# Patient Record
Sex: FEMALE | Race: BLACK OR AFRICAN AMERICAN | NOT HISPANIC OR LATINO | Employment: UNEMPLOYED | URBAN - METROPOLITAN AREA
[De-identification: names, ages, dates, MRNs, and addresses within clinical notes are randomized per-mention and may not be internally consistent; named-entity substitution may affect disease eponyms.]

---

## 2017-02-15 ENCOUNTER — ALLSCRIPTS OFFICE VISIT (OUTPATIENT)
Dept: OTHER | Facility: OTHER | Age: 32
End: 2017-02-15

## 2017-02-27 ENCOUNTER — GENERIC CONVERSION - ENCOUNTER (OUTPATIENT)
Dept: OTHER | Facility: OTHER | Age: 32
End: 2017-02-27

## 2017-03-15 ENCOUNTER — GENERIC CONVERSION - ENCOUNTER (OUTPATIENT)
Dept: OTHER | Facility: OTHER | Age: 32
End: 2017-03-15

## 2017-03-21 ENCOUNTER — APPOINTMENT (OUTPATIENT)
Dept: LAB | Facility: CLINIC | Age: 32
End: 2017-03-21
Payer: COMMERCIAL

## 2017-03-21 ENCOUNTER — ALLSCRIPTS OFFICE VISIT (OUTPATIENT)
Dept: OTHER | Facility: OTHER | Age: 32
End: 2017-03-21

## 2017-03-21 DIAGNOSIS — Z34.90 ENCOUNTER FOR SUPERVISION OF NORMAL PREGNANCY: ICD-10-CM

## 2017-03-21 LAB
BACTERIA UR QL AUTO: ABNORMAL /HPF
BASOPHILS # BLD AUTO: 0.02 THOUSANDS/ΜL (ref 0–0.1)
BASOPHILS NFR BLD AUTO: 0 % (ref 0–1)
BILIRUB UR QL STRIP: NEGATIVE
CLARITY UR: ABNORMAL
COLOR UR: YELLOW
EOSINOPHIL # BLD AUTO: 0.1 THOUSAND/ΜL (ref 0–0.61)
EOSINOPHIL NFR BLD AUTO: 1 % (ref 0–6)
ERYTHROCYTE [DISTWIDTH] IN BLOOD BY AUTOMATED COUNT: 15.7 % (ref 11.6–15.1)
GLUCOSE UR STRIP-MCNC: NEGATIVE MG/DL
HCT VFR BLD AUTO: 36.6 % (ref 34.8–46.1)
HGB BLD-MCNC: 12 G/DL (ref 11.5–15.4)
HGB UR QL STRIP.AUTO: NEGATIVE
KETONES UR STRIP-MCNC: NEGATIVE MG/DL
LEUKOCYTE ESTERASE UR QL STRIP: ABNORMAL
LYMPHOCYTES # BLD AUTO: 2.81 THOUSANDS/ΜL (ref 0.6–4.47)
LYMPHOCYTES NFR BLD AUTO: 19 % (ref 14–44)
MCH RBC QN AUTO: 27 PG (ref 26.8–34.3)
MCHC RBC AUTO-ENTMCNC: 32.8 G/DL (ref 31.4–37.4)
MCV RBC AUTO: 82 FL (ref 82–98)
MONOCYTES # BLD AUTO: 0.93 THOUSAND/ΜL (ref 0.17–1.22)
MONOCYTES NFR BLD AUTO: 6 % (ref 4–12)
NEUTROPHILS # BLD AUTO: 10.71 THOUSANDS/ΜL (ref 1.85–7.62)
NEUTS SEG NFR BLD AUTO: 74 % (ref 43–75)
NITRITE UR QL STRIP: NEGATIVE
NON-SQ EPI CELLS URNS QL MICRO: ABNORMAL /HPF
NRBC BLD AUTO-RTO: 0 /100 WBCS
PH UR STRIP.AUTO: 6.5 [PH] (ref 4.5–8)
PLATELET # BLD AUTO: 297 THOUSANDS/UL (ref 149–390)
PMV BLD AUTO: 10 FL (ref 8.9–12.7)
PROT UR STRIP-MCNC: ABNORMAL MG/DL
RBC # BLD AUTO: 4.44 MILLION/UL (ref 3.81–5.12)
RBC #/AREA URNS AUTO: ABNORMAL /HPF
RUBV IGG SERPL IA-ACNC: 59.8 IU/ML
SP GR UR STRIP.AUTO: 1.03 (ref 1–1.03)
UROBILINOGEN UR QL STRIP.AUTO: 0.2 E.U./DL
WBC # BLD AUTO: 14.61 THOUSAND/UL (ref 4.31–10.16)
WBC #/AREA URNS AUTO: ABNORMAL /HPF

## 2017-03-21 PROCEDURE — 87086 URINE CULTURE/COLONY COUNT: CPT

## 2017-03-21 PROCEDURE — 80081 OBSTETRIC PANEL INC HIV TSTG: CPT

## 2017-03-21 PROCEDURE — 81001 URINALYSIS AUTO W/SCOPE: CPT

## 2017-03-21 PROCEDURE — 36415 COLL VENOUS BLD VENIPUNCTURE: CPT

## 2017-03-22 LAB
BACTERIA UR CULT: NORMAL
HBV SURFACE AG SER QL: NORMAL
HIV 1+2 AB+HIV1 P24 AG SERPL QL IA: NORMAL
RPR SER QL: NORMAL

## 2017-03-23 ENCOUNTER — GENERIC CONVERSION - ENCOUNTER (OUTPATIENT)
Dept: OTHER | Facility: OTHER | Age: 32
End: 2017-03-23

## 2017-03-23 ENCOUNTER — LAB REQUISITION (OUTPATIENT)
Dept: LAB | Facility: HOSPITAL | Age: 32
End: 2017-03-23
Payer: COMMERCIAL

## 2017-03-23 DIAGNOSIS — Z34.90 ENCOUNTER FOR SUPERVISION OF NORMAL PREGNANCY: ICD-10-CM

## 2017-03-23 PROCEDURE — 88175 CYTOPATH C/V AUTO FLUID REDO: CPT | Performed by: OBSTETRICS & GYNECOLOGY

## 2017-03-23 PROCEDURE — 87491 CHLMYD TRACH DNA AMP PROBE: CPT | Performed by: OBSTETRICS & GYNECOLOGY

## 2017-03-23 PROCEDURE — 87624 HPV HI-RISK TYP POOLED RSLT: CPT | Performed by: OBSTETRICS & GYNECOLOGY

## 2017-03-23 PROCEDURE — 87591 N.GONORRHOEAE DNA AMP PROB: CPT | Performed by: OBSTETRICS & GYNECOLOGY

## 2017-03-24 ENCOUNTER — GENERIC CONVERSION - ENCOUNTER (OUTPATIENT)
Dept: OTHER | Facility: OTHER | Age: 32
End: 2017-03-24

## 2017-03-31 ENCOUNTER — ALLSCRIPTS OFFICE VISIT (OUTPATIENT)
Dept: PERINATAL CARE | Facility: MEDICAL CENTER | Age: 32
End: 2017-03-31
Payer: COMMERCIAL

## 2017-03-31 ENCOUNTER — GENERIC CONVERSION - ENCOUNTER (OUTPATIENT)
Dept: OTHER | Facility: OTHER | Age: 32
End: 2017-03-31

## 2017-03-31 LAB
CHLAMYDIA DNA CVX QL NAA+PROBE: NORMAL
HPV RRNA GENITAL QL NAA+PROBE: NORMAL
N GONORRHOEA DNA GENITAL QL NAA+PROBE: NORMAL

## 2017-03-31 PROCEDURE — 76817 TRANSVAGINAL US OBSTETRIC: CPT | Performed by: OBSTETRICS & GYNECOLOGY

## 2017-03-31 PROCEDURE — 76805 OB US >/= 14 WKS SNGL FETUS: CPT | Performed by: OBSTETRICS & GYNECOLOGY

## 2017-04-03 LAB
LAB AP GYN PRIMARY INTERPRETATION: NORMAL
LAB AP LMP: NORMAL
Lab: NORMAL

## 2017-04-14 ENCOUNTER — GENERIC CONVERSION - ENCOUNTER (OUTPATIENT)
Dept: OTHER | Facility: OTHER | Age: 32
End: 2017-04-14

## 2017-04-14 ENCOUNTER — ALLSCRIPTS OFFICE VISIT (OUTPATIENT)
Dept: PERINATAL CARE | Facility: MEDICAL CENTER | Age: 32
End: 2017-04-14
Payer: COMMERCIAL

## 2017-04-14 PROCEDURE — 76817 TRANSVAGINAL US OBSTETRIC: CPT | Performed by: OBSTETRICS & GYNECOLOGY

## 2017-04-18 ENCOUNTER — GENERIC CONVERSION - ENCOUNTER (OUTPATIENT)
Dept: OTHER | Facility: OTHER | Age: 32
End: 2017-04-18

## 2017-04-21 ENCOUNTER — APPOINTMENT (OUTPATIENT)
Dept: PERINATAL CARE | Facility: MEDICAL CENTER | Age: 32
End: 2017-04-21
Payer: COMMERCIAL

## 2017-04-21 ENCOUNTER — GENERIC CONVERSION - ENCOUNTER (OUTPATIENT)
Dept: OTHER | Facility: OTHER | Age: 32
End: 2017-04-21

## 2017-04-21 PROCEDURE — 96372 THER/PROPH/DIAG INJ SC/IM: CPT | Performed by: OBSTETRICS & GYNECOLOGY

## 2017-04-28 ENCOUNTER — GENERIC CONVERSION - ENCOUNTER (OUTPATIENT)
Dept: OTHER | Facility: OTHER | Age: 32
End: 2017-04-28

## 2017-04-28 ENCOUNTER — APPOINTMENT (OUTPATIENT)
Dept: PERINATAL CARE | Facility: MEDICAL CENTER | Age: 32
End: 2017-04-28
Payer: COMMERCIAL

## 2017-04-28 PROCEDURE — 96372 THER/PROPH/DIAG INJ SC/IM: CPT | Performed by: OBSTETRICS & GYNECOLOGY

## 2017-05-02 ENCOUNTER — ALLSCRIPTS OFFICE VISIT (OUTPATIENT)
Dept: PERINATAL CARE | Facility: MEDICAL CENTER | Age: 32
End: 2017-05-02
Payer: COMMERCIAL

## 2017-05-02 ENCOUNTER — GENERIC CONVERSION - ENCOUNTER (OUTPATIENT)
Dept: OTHER | Facility: OTHER | Age: 32
End: 2017-05-02

## 2017-05-02 PROCEDURE — 76811 OB US DETAILED SNGL FETUS: CPT | Performed by: OBSTETRICS & GYNECOLOGY

## 2017-05-02 PROCEDURE — 76817 TRANSVAGINAL US OBSTETRIC: CPT | Performed by: OBSTETRICS & GYNECOLOGY

## 2017-05-12 ENCOUNTER — ALLSCRIPTS OFFICE VISIT (OUTPATIENT)
Dept: PERINATAL CARE | Facility: MEDICAL CENTER | Age: 32
End: 2017-05-12
Payer: COMMERCIAL

## 2017-05-12 ENCOUNTER — GENERIC CONVERSION - ENCOUNTER (OUTPATIENT)
Dept: OTHER | Facility: OTHER | Age: 32
End: 2017-05-12

## 2017-05-12 PROCEDURE — 96372 THER/PROPH/DIAG INJ SC/IM: CPT | Performed by: OBSTETRICS & GYNECOLOGY

## 2017-05-12 PROCEDURE — 76817 TRANSVAGINAL US OBSTETRIC: CPT | Performed by: OBSTETRICS & GYNECOLOGY

## 2017-05-19 ENCOUNTER — GENERIC CONVERSION - ENCOUNTER (OUTPATIENT)
Dept: PERINATAL CARE | Facility: MEDICAL CENTER | Age: 32
End: 2017-05-19

## 2017-05-19 ENCOUNTER — ALLSCRIPTS OFFICE VISIT (OUTPATIENT)
Dept: PERINATAL CARE | Facility: MEDICAL CENTER | Age: 32
End: 2017-05-19
Payer: COMMERCIAL

## 2017-05-19 ENCOUNTER — GENERIC CONVERSION - ENCOUNTER (OUTPATIENT)
Dept: OTHER | Facility: OTHER | Age: 32
End: 2017-05-19

## 2017-05-19 PROCEDURE — 76815 OB US LIMITED FETUS(S): CPT | Performed by: OBSTETRICS & GYNECOLOGY

## 2017-05-19 PROCEDURE — 96372 THER/PROPH/DIAG INJ SC/IM: CPT | Performed by: OBSTETRICS & GYNECOLOGY

## 2017-05-19 PROCEDURE — 76817 TRANSVAGINAL US OBSTETRIC: CPT | Performed by: OBSTETRICS & GYNECOLOGY

## 2017-05-25 ENCOUNTER — GENERIC CONVERSION - ENCOUNTER (OUTPATIENT)
Dept: OTHER | Facility: OTHER | Age: 32
End: 2017-05-25

## 2017-05-26 ENCOUNTER — GENERIC CONVERSION - ENCOUNTER (OUTPATIENT)
Dept: OTHER | Facility: OTHER | Age: 32
End: 2017-05-26

## 2017-05-26 ENCOUNTER — APPOINTMENT (OUTPATIENT)
Dept: PERINATAL CARE | Facility: MEDICAL CENTER | Age: 32
End: 2017-05-26
Payer: COMMERCIAL

## 2017-05-26 PROCEDURE — 96372 THER/PROPH/DIAG INJ SC/IM: CPT | Performed by: OBSTETRICS & GYNECOLOGY

## 2017-05-30 ENCOUNTER — ALLSCRIPTS OFFICE VISIT (OUTPATIENT)
Dept: PERINATAL CARE | Facility: MEDICAL CENTER | Age: 32
End: 2017-05-30
Payer: COMMERCIAL

## 2017-05-30 ENCOUNTER — GENERIC CONVERSION - ENCOUNTER (OUTPATIENT)
Dept: OTHER | Facility: OTHER | Age: 32
End: 2017-05-30

## 2017-05-30 PROCEDURE — 76817 TRANSVAGINAL US OBSTETRIC: CPT | Performed by: OBSTETRICS & GYNECOLOGY

## 2017-05-30 PROCEDURE — 76816 OB US FOLLOW-UP PER FETUS: CPT | Performed by: OBSTETRICS & GYNECOLOGY

## 2017-06-02 ENCOUNTER — ALLSCRIPTS OFFICE VISIT (OUTPATIENT)
Dept: PERINATAL CARE | Facility: MEDICAL CENTER | Age: 32
End: 2017-06-02
Payer: COMMERCIAL

## 2017-06-02 PROCEDURE — 96372 THER/PROPH/DIAG INJ SC/IM: CPT | Performed by: OBSTETRICS & GYNECOLOGY

## 2017-06-05 ENCOUNTER — GENERIC CONVERSION - ENCOUNTER (OUTPATIENT)
Dept: OTHER | Facility: OTHER | Age: 32
End: 2017-06-05

## 2017-06-09 ENCOUNTER — GENERIC CONVERSION - ENCOUNTER (OUTPATIENT)
Dept: OTHER | Facility: OTHER | Age: 32
End: 2017-06-09

## 2017-06-09 ENCOUNTER — APPOINTMENT (OUTPATIENT)
Dept: PERINATAL CARE | Facility: MEDICAL CENTER | Age: 32
End: 2017-06-09
Payer: COMMERCIAL

## 2017-06-09 PROCEDURE — 96372 THER/PROPH/DIAG INJ SC/IM: CPT | Performed by: OBSTETRICS & GYNECOLOGY

## 2017-06-14 DIAGNOSIS — Z34.90 ENCOUNTER FOR SUPERVISION OF NORMAL PREGNANCY: ICD-10-CM

## 2017-06-16 ENCOUNTER — ALLSCRIPTS OFFICE VISIT (OUTPATIENT)
Dept: PERINATAL CARE | Facility: MEDICAL CENTER | Age: 32
End: 2017-06-16
Payer: COMMERCIAL

## 2017-06-16 PROCEDURE — 96372 THER/PROPH/DIAG INJ SC/IM: CPT | Performed by: OBSTETRICS & GYNECOLOGY

## 2017-06-20 ENCOUNTER — HOSPITAL ENCOUNTER (OUTPATIENT)
Facility: HOSPITAL | Age: 32
Discharge: HOME | End: 2017-06-20
Attending: OBSTETRICS & GYNECOLOGY | Admitting: OBSTETRICS & GYNECOLOGY
Payer: COMMERCIAL

## 2017-06-20 VITALS
HEIGHT: 65 IN | TEMPERATURE: 99 F | HEART RATE: 108 BPM | WEIGHT: 202 LBS | SYSTOLIC BLOOD PRESSURE: 122 MMHG | BODY MASS INDEX: 33.66 KG/M2 | DIASTOLIC BLOOD PRESSURE: 64 MMHG | RESPIRATION RATE: 16 BRPM

## 2017-06-20 DIAGNOSIS — O47.00 PREMATURE UTERINE CONTRACTIONS: ICD-10-CM

## 2017-06-20 PROCEDURE — 99213 OFFICE O/P EST LOW 20 MIN: CPT

## 2017-06-20 PROCEDURE — 76817 TRANSVAGINAL US OBSTETRIC: CPT | Performed by: OBSTETRICS & GYNECOLOGY

## 2017-06-20 RX ORDER — BETAMETHASONE SODIUM PHOSPHATE AND BETAMETHASONE ACETATE 3; 3 MG/ML; MG/ML
12 INJECTION, SUSPENSION INTRA-ARTICULAR; INTRALESIONAL; INTRAMUSCULAR; SOFT TISSUE EVERY 24 HOURS
Status: DISCONTINUED | OUTPATIENT
Start: 2017-06-20 | End: 2017-06-20 | Stop reason: HOSPADM

## 2017-06-20 RX ADMIN — BETAMETHASONE SODIUM PHOSPHATE AND BETAMETHASONE ACETATE 12 MG: 3; 3 INJECTION, SUSPENSION INTRA-ARTICULAR; INTRALESIONAL; INTRAMUSCULAR at 13:50

## 2017-06-21 ENCOUNTER — HOSPITAL ENCOUNTER (OUTPATIENT)
Facility: HOSPITAL | Age: 32
Discharge: HOME | End: 2017-06-21
Attending: OBSTETRICS & GYNECOLOGY | Admitting: OBSTETRICS & GYNECOLOGY
Payer: COMMERCIAL

## 2017-06-21 ENCOUNTER — HOSPITAL ENCOUNTER (OUTPATIENT)
Facility: HOSPITAL | Age: 32
End: 2017-06-21
Attending: OBSTETRICS & GYNECOLOGY | Admitting: OBSTETRICS & GYNECOLOGY
Payer: COMMERCIAL

## 2017-06-21 PROCEDURE — 96372 THER/PROPH/DIAG INJ SC/IM: CPT

## 2017-06-21 RX ORDER — BETAMETHASONE SODIUM PHOSPHATE AND BETAMETHASONE ACETATE 3; 3 MG/ML; MG/ML
12 INJECTION, SUSPENSION INTRA-ARTICULAR; INTRALESIONAL; INTRAMUSCULAR; SOFT TISSUE ONCE
Status: COMPLETED | OUTPATIENT
Start: 2017-06-21 | End: 2017-06-21

## 2017-06-21 RX ADMIN — BETAMETHASONE SODIUM PHOSPHATE AND BETAMETHASONE ACETATE 12 MG: 3; 3 INJECTION, SUSPENSION INTRA-ARTICULAR; INTRALESIONAL; INTRAMUSCULAR at 14:44

## 2017-06-23 ENCOUNTER — ALLSCRIPTS OFFICE VISIT (OUTPATIENT)
Dept: PERINATAL CARE | Facility: MEDICAL CENTER | Age: 32
End: 2017-06-23
Payer: COMMERCIAL

## 2017-06-23 PROCEDURE — 96372 THER/PROPH/DIAG INJ SC/IM: CPT | Performed by: OBSTETRICS & GYNECOLOGY

## 2017-06-27 ENCOUNTER — GENERIC CONVERSION - ENCOUNTER (OUTPATIENT)
Dept: OTHER | Facility: OTHER | Age: 32
End: 2017-06-27

## 2017-06-30 ENCOUNTER — GENERIC CONVERSION - ENCOUNTER (OUTPATIENT)
Dept: OTHER | Facility: OTHER | Age: 32
End: 2017-06-30

## 2017-06-30 ENCOUNTER — ALLSCRIPTS OFFICE VISIT (OUTPATIENT)
Dept: PERINATAL CARE | Facility: CLINIC | Age: 32
End: 2017-06-30
Payer: COMMERCIAL

## 2017-06-30 PROCEDURE — 76816 OB US FOLLOW-UP PER FETUS: CPT | Performed by: OBSTETRICS & GYNECOLOGY

## 2017-06-30 PROCEDURE — 96372 THER/PROPH/DIAG INJ SC/IM: CPT | Performed by: OBSTETRICS & GYNECOLOGY

## 2017-07-07 ENCOUNTER — ALLSCRIPTS OFFICE VISIT (OUTPATIENT)
Dept: PERINATAL CARE | Facility: MEDICAL CENTER | Age: 32
End: 2017-07-07
Payer: COMMERCIAL

## 2017-07-07 PROCEDURE — 96372 THER/PROPH/DIAG INJ SC/IM: CPT | Performed by: OBSTETRICS & GYNECOLOGY

## 2017-07-14 ENCOUNTER — ALLSCRIPTS OFFICE VISIT (OUTPATIENT)
Dept: PERINATAL CARE | Facility: MEDICAL CENTER | Age: 32
End: 2017-07-14
Payer: COMMERCIAL

## 2017-07-14 PROCEDURE — 96372 THER/PROPH/DIAG INJ SC/IM: CPT | Performed by: OBSTETRICS & GYNECOLOGY

## 2017-07-21 ENCOUNTER — APPOINTMENT (OUTPATIENT)
Dept: PERINATAL CARE | Facility: MEDICAL CENTER | Age: 32
End: 2017-07-21
Payer: COMMERCIAL

## 2017-07-21 ENCOUNTER — GENERIC CONVERSION - ENCOUNTER (OUTPATIENT)
Dept: OTHER | Facility: OTHER | Age: 32
End: 2017-07-21

## 2017-07-21 PROCEDURE — 96372 THER/PROPH/DIAG INJ SC/IM: CPT | Performed by: OBSTETRICS & GYNECOLOGY

## 2017-07-24 ENCOUNTER — GENERIC CONVERSION - ENCOUNTER (OUTPATIENT)
Dept: OTHER | Facility: OTHER | Age: 32
End: 2017-07-24

## 2017-07-28 ENCOUNTER — APPOINTMENT (OUTPATIENT)
Dept: LAB | Facility: MEDICAL CENTER | Age: 32
End: 2017-07-28
Payer: COMMERCIAL

## 2017-07-28 ENCOUNTER — ALLSCRIPTS OFFICE VISIT (OUTPATIENT)
Dept: PERINATAL CARE | Facility: MEDICAL CENTER | Age: 32
End: 2017-07-28
Payer: COMMERCIAL

## 2017-07-28 PROCEDURE — 96372 THER/PROPH/DIAG INJ SC/IM: CPT | Performed by: OBSTETRICS & GYNECOLOGY

## 2017-07-28 PROCEDURE — 86592 SYPHILIS TEST NON-TREP QUAL: CPT

## 2017-07-28 PROCEDURE — 36415 COLL VENOUS BLD VENIPUNCTURE: CPT

## 2017-07-28 PROCEDURE — 82950 GLUCOSE TEST: CPT

## 2017-07-28 PROCEDURE — 85025 COMPLETE CBC W/AUTO DIFF WBC: CPT

## 2017-08-04 ENCOUNTER — ALLSCRIPTS OFFICE VISIT (OUTPATIENT)
Dept: PERINATAL CARE | Facility: MEDICAL CENTER | Age: 32
End: 2017-08-04
Payer: COMMERCIAL

## 2017-08-04 PROCEDURE — 96372 THER/PROPH/DIAG INJ SC/IM: CPT | Performed by: OBSTETRICS & GYNECOLOGY

## 2017-08-10 ENCOUNTER — GENERIC CONVERSION - ENCOUNTER (OUTPATIENT)
Dept: OTHER | Facility: OTHER | Age: 32
End: 2017-08-10

## 2017-08-14 ENCOUNTER — HOSPITAL ENCOUNTER (OUTPATIENT)
Facility: HOSPITAL | Age: 32
Discharge: HOME | End: 2017-08-15
Attending: OBSTETRICS & GYNECOLOGY | Admitting: OBSTETRICS & GYNECOLOGY
Payer: COMMERCIAL

## 2017-08-14 VITALS
DIASTOLIC BLOOD PRESSURE: 71 MMHG | TEMPERATURE: 97.1 F | HEART RATE: 97 BPM | RESPIRATION RATE: 20 BRPM | SYSTOLIC BLOOD PRESSURE: 119 MMHG

## 2017-08-14 DIAGNOSIS — O09.899 HISTORY OF PRETERM DELIVERY, CURRENTLY PREGNANT: ICD-10-CM

## 2017-08-14 DIAGNOSIS — Z3A.34 34 WEEKS GESTATION OF PREGNANCY: ICD-10-CM

## 2017-08-15 PROCEDURE — 99214 OFFICE O/P EST MOD 30 MIN: CPT

## 2017-08-16 ENCOUNTER — HOSPITAL ENCOUNTER (OUTPATIENT)
Facility: HOSPITAL | Age: 32
End: 2017-08-16
Attending: OBSTETRICS & GYNECOLOGY | Admitting: OBSTETRICS & GYNECOLOGY
Payer: COMMERCIAL

## 2017-08-16 ENCOUNTER — HOSPITAL ENCOUNTER (OUTPATIENT)
Facility: HOSPITAL | Age: 32
Discharge: HOME | End: 2017-08-16
Attending: OBSTETRICS & GYNECOLOGY | Admitting: OBSTETRICS & GYNECOLOGY
Payer: COMMERCIAL

## 2017-08-16 ENCOUNTER — GENERIC CONVERSION - ENCOUNTER (OUTPATIENT)
Dept: OTHER | Facility: OTHER | Age: 32
End: 2017-08-16

## 2017-08-16 VITALS
RESPIRATION RATE: 18 BRPM | SYSTOLIC BLOOD PRESSURE: 108 MMHG | HEART RATE: 106 BPM | TEMPERATURE: 98.7 F | DIASTOLIC BLOOD PRESSURE: 61 MMHG

## 2017-08-16 DIAGNOSIS — Z3A.34 34 WEEKS GESTATION OF PREGNANCY: ICD-10-CM

## 2017-08-16 PROBLEM — E66.09 NON MORBID OBESITY DUE TO EXCESS CALORIES: Status: ACTIVE | Noted: 2017-08-16

## 2017-08-16 PROBLEM — Z98.891 HX OF CESAREAN SECTION: Status: ACTIVE | Noted: 2017-08-16

## 2017-08-16 PROCEDURE — 99213 OFFICE O/P EST LOW 20 MIN: CPT

## 2017-08-16 RX ORDER — HYDROXYPROGESTERONE CAPROATE 250 MG/ML
250 INJECTION INTRAMUSCULAR WEEKLY
Status: ON HOLD | COMMUNITY
Start: 2017-04-01 | End: 2017-09-22 | Stop reason: ALTCHOICE

## 2017-08-16 RX ORDER — ALBUTEROL SULFATE 90 UG/1
2 AEROSOL, METERED RESPIRATORY (INHALATION)
COMMUNITY
Start: 2016-06-01

## 2017-08-16 NOTE — DISCHARGE INSTRUCTIONS
Pregnancy at 31 to 34 [address]:   You may continue to have symptoms such as shortness of breath, heartburn, contractions, or swelling of your ankles and feet  You may be gaining about 1 pound a week now  DISCHARGE INSTRUCTIONS:   Seek care immediately if:   · You develop a severe headache that does not go away  · You have new or increased vision changes, such as blurred or spotted vision  · You have new or increased swelling in your face or hands  · You have vaginal spotting or bleeding  · Your water broke or you feel warm water gushing or trickling from your vagina  Contact your healthcare provider if:   · You have more than 5 contractions in 1 hour  · You notice any changes in your baby's movements  · You have abdominal cramps, pressure, or tightening  · You have a change in vaginal discharge  · You have chills or a fever  · You have vaginal itching, burning, or pain  · You have yellow, green, white, or foul-smelling vaginal discharge  · You have pain or burning when you urinate, less urine than usual, or pink or bloody urine  · You have questions or concerns about your condition or care  How to care for yourself at this stage of your pregnancy:   · Eat a variety of healthy foods  Healthy foods include fruits, vegetables, whole-grain breads, low-fat dairy foods, beans, lean meats, and fish  Drink liquids as directed  Ask how much liquid to drink each day and which liquids are best for you  Limit caffeine to less than 200 milligrams each day  Limit your intake of fish to 2 servings each week  Choose fish low in mercury such as canned light tuna, shrimp, salmon, cod, or tilapia  Do not  eat fish high in mercury such as swordfish, tilefish, oswaldo mackerel, and shark  · Manage heartburn  by eating 4 or 5 small meals each day instead of large meals  Avoid spicy food  · Manage swelling  by lying down and putting your feet up       · Take prenatal vitamins as directed  Your need for certain vitamins and minerals, such as folic acid, increases during pregnancy  Prenatal vitamins provide some of the extra vitamins and minerals you need  Prenatal vitamins may also help to decrease the risk of certain birth defects  · Talk to your healthcare provider about exercise  Moderate exercise can help you stay fit  Your healthcare provider will help you plan an exercise program that is safe for you during pregnancy  · Do not smoke  If you smoke, it is never too late to quit  Smoking increases your risk of a miscarriage and other health problems during your pregnancy  Smoking can cause your baby to be born too early or weigh less at birth  Ask your healthcare provider for information if you need help quitting  · Do not drink alcohol  Alcohol passes from your body to your baby through the placenta  It can affect your baby's brain development and cause fetal alcohol syndrome (FAS)  FAS is a group of conditions that causes mental, behavior, and growth problems  · Talk to your healthcare provider before you take any medicines  Many medicines may harm your baby if you take them when you are pregnant  Do not take any medicines, vitamins, herbs, or supplements without first talking to your healthcare provider  Never use illegal or street drugs (such as marijuana or cocaine) while you are pregnant  Safety tips during pregnancy:   · Avoid hot tubs and saunas  Do not use a hot tub or sauna while you are pregnant, especially during your first trimester  Hot tubs and saunas may raise your baby's temperature and increase the risk of birth defects  · Avoid toxoplasmosis  This is an infection caused by eating raw meat or being around infected cat feces  It can cause birth defects, miscarriages, and other problems  Wash your hands after you touch raw meat  Make sure any meat is well-cooked before you eat it  Avoid raw eggs and unpasteurized milk   Use gloves or ask someone else to clean your cat's litter box while you are pregnant  Changes that are happening with your baby:  By 34 weeks, your baby may weigh more than 5 pounds  Your baby will be about 12 ½ inches long from the top of the head to the rump (baby's bottom)  Your baby is gaining about ½ pound a week  Your baby's eyes open and close now  Your baby's kicks and movements are more forceful at this time  What you need to know about prenatal care: Your healthcare provider will check your blood pressure and weight  You may also need the following:  · A urine test  may also be done to check for sugar and protein  These can be signs of gestational diabetes or infection  Protein in your urine may also be a sign of preeclampsia  Preeclampsia is a condition that can develop during week 20 or later of your pregnancy  It causes high blood pressure, and it can cause problems with your kidneys and other organs  · A Tdap vaccine  may be recommended by your healthcare provider  · Fundal height  is a measurement of your uterus to check your baby's growth  This number is usually the same as the number of weeks that you have been pregnant  Your healthcare provider may also check your baby's position  · Your baby's heart rate  will be checked  © 2017 [address] [employer] Information is for End User's use only and may not be sold, redistributed or otherwise used for commercial purposes  All illustrations and images included in CareNotes® are the copyrighted property of [employer]  or [employer]  The above information is an  only  It is not intended as medical advice for individual conditions or treatments  Talk to your doctor, nurse or pharmacist before following any medical regimen to see if it is safe and effective for you

## 2017-08-16 NOTE — PROGRESS NOTES
L&D Triage Note - OB/GYN  Nilda Medellin 29 y o  female MRN: [id number]  Unit/Bed#: LD Triage  Encounter: [id number]      Assessment:  29 y o  [id number] at 34w6d with  contractions and cervical change, not in labor    Plan:  1  Labor precautions reviewed  2  Continued to  on importance of PO hydration  3  Continued to  on improtance of Orange injections, for injection on , Follow-up with DANIEL for next scheduled prenatal visit    Discussed with Dr Rosa Isela Viera, DO        ______________________________________________________________________      Chief Compliant: "I am remberto/I was sent from the office for further evaluation"    TIME: 2:32  Subjective:  29 y o  [id number] at 34w6d who presents for evaluation of contractions and for further evaluation of possible  labor  Patient's current labor course has been complicated by  contractions, first appreciated during 26 weeks of gestation  At 26 5-6 days, she received a course of BTM, given her history of a  delivery at 27 weeks  Patient has also been prescribed Haleigh, but notes not being compliant with injections, stating it makes her "sore and not feel right"  Patient presented to triage two days prior for complaints of  labor  At that time, SVE was 1/50/-3 and unchanged after 2 hour recheck  Patient was discharged with labor precautions at that time  Earlier today, she presented to the office for a prenatal appointment  She was found to be 3/60/-3  Therefore, she presents to triage for further evaluation  She endorses contractions this morning that began around 5:00 am   Currently not experiencing contractions  Denies leakage of fluids  Denies vaginal bleeding  Reports adequate fetal movement      Objective:  Vitals:    17 1351   BP: 108/61   Pulse: (!) 106   Resp: 18   Temp: 98 7 °F (37 1 °C)       SVE: 3 / 60% / -3  FHT:  145 / Moderate 6 - 25 bpm / + accelerations, - decelrations  [place]: Irritable    Kaleigh Fitch DO 8/16/2017 2:31 PM

## 2017-08-18 ENCOUNTER — GENERIC CONVERSION - ENCOUNTER (OUTPATIENT)
Dept: OTHER | Facility: OTHER | Age: 32
End: 2017-08-18

## 2017-08-18 ENCOUNTER — ALLSCRIPTS OFFICE VISIT (OUTPATIENT)
Dept: PERINATAL CARE | Facility: MEDICAL CENTER | Age: 32
End: 2017-08-18
Payer: COMMERCIAL

## 2017-08-18 ENCOUNTER — GENERIC CONVERSION - ENCOUNTER (OUTPATIENT)
Dept: PERINATAL CARE | Facility: MEDICAL CENTER | Age: 32
End: 2017-08-18

## 2017-08-18 PROCEDURE — 76816 OB US FOLLOW-UP PER FETUS: CPT | Performed by: OBSTETRICS & GYNECOLOGY

## 2017-08-18 PROCEDURE — 96372 THER/PROPH/DIAG INJ SC/IM: CPT | Performed by: OBSTETRICS & GYNECOLOGY

## 2017-08-21 ENCOUNTER — GENERIC CONVERSION - ENCOUNTER (OUTPATIENT)
Dept: OTHER | Facility: OTHER | Age: 32
End: 2017-08-21

## 2017-08-21 ENCOUNTER — HOSPITAL ENCOUNTER (OUTPATIENT)
Facility: HOSPITAL | Age: 32
Discharge: HOME | End: 2017-08-21
Attending: OBSTETRICS & GYNECOLOGY | Admitting: OBSTETRICS & GYNECOLOGY
Payer: COMMERCIAL

## 2017-08-21 VITALS
DIASTOLIC BLOOD PRESSURE: 67 MMHG | TEMPERATURE: 99.5 F | RESPIRATION RATE: 18 BRPM | HEART RATE: 96 BPM | SYSTOLIC BLOOD PRESSURE: 109 MMHG

## 2017-08-21 DIAGNOSIS — O60.03 PRETERM LABOR IN THIRD TRIMESTER WITHOUT DELIVERY: ICD-10-CM

## 2017-08-21 DIAGNOSIS — Z3A.35 35 WEEKS GESTATION OF PREGNANCY: ICD-10-CM

## 2017-08-21 PROCEDURE — 99213 OFFICE O/P EST LOW 20 MIN: CPT

## 2017-08-25 ENCOUNTER — APPOINTMENT (OUTPATIENT)
Dept: PERINATAL CARE | Facility: MEDICAL CENTER | Age: 32
End: 2017-08-25
Payer: COMMERCIAL

## 2017-08-28 ENCOUNTER — LAB REQUISITION (OUTPATIENT)
Dept: LAB | Facility: HOSPITAL | Age: 32
End: 2017-08-28
Payer: COMMERCIAL

## 2017-08-28 ENCOUNTER — ALLSCRIPTS OFFICE VISIT (OUTPATIENT)
Dept: OTHER | Facility: OTHER | Age: 32
End: 2017-08-28

## 2017-08-28 DIAGNOSIS — Z34.90 ENCOUNTER FOR SUPERVISION OF NORMAL PREGNANCY: ICD-10-CM

## 2017-08-28 DIAGNOSIS — N92.6 IRREGULAR MENSTRUATION: ICD-10-CM

## 2017-08-28 PROCEDURE — 87653 STREP B DNA AMP PROBE: CPT | Performed by: PHYSICIAN ASSISTANT

## 2017-08-31 LAB — GP B STREP DNA SPEC QL NAA+PROBE: ABNORMAL

## 2017-09-11 ENCOUNTER — GENERIC CONVERSION - ENCOUNTER (OUTPATIENT)
Dept: OTHER | Facility: OTHER | Age: 32
End: 2017-09-11

## 2017-09-20 ENCOUNTER — GENERIC CONVERSION - ENCOUNTER (OUTPATIENT)
Dept: OTHER | Facility: OTHER | Age: 32
End: 2017-09-20

## 2017-09-22 ENCOUNTER — GENERIC CONVERSION - ENCOUNTER (OUTPATIENT)
Dept: OTHER | Facility: OTHER | Age: 32
End: 2017-09-22

## 2017-09-22 ENCOUNTER — HOSPITAL ENCOUNTER (INPATIENT)
Facility: HOSPITAL | Age: 32
LOS: 2 days | Discharge: HOME | DRG: 560 | End: 2017-09-24
Attending: OBSTETRICS & GYNECOLOGY | Admitting: OBSTETRICS & GYNECOLOGY
Payer: COMMERCIAL

## 2017-09-22 DIAGNOSIS — O99.210 MATERNAL OBESITY SYNDROME: ICD-10-CM

## 2017-09-22 DIAGNOSIS — F32.A DEPRESSION: ICD-10-CM

## 2017-09-22 DIAGNOSIS — O47.1 FALSE LABOR AFTER 37 WEEKS OF GESTATION WITHOUT DELIVERY: ICD-10-CM

## 2017-09-22 DIAGNOSIS — E66.09 NON MORBID OBESITY DUE TO EXCESS CALORIES: ICD-10-CM

## 2017-09-22 DIAGNOSIS — D50.9 IRON DEFICIENCY ANEMIA: ICD-10-CM

## 2017-09-22 DIAGNOSIS — Z98.891 HISTORY OF CESAREAN SECTION: ICD-10-CM

## 2017-09-22 DIAGNOSIS — O99.330 TOBACCO SMOKING COMPLICATING PREGNANCY: ICD-10-CM

## 2017-09-22 DIAGNOSIS — O09.90 HIGH-RISK PREGNANCY: ICD-10-CM

## 2017-09-22 DIAGNOSIS — Z3A.40 40 WEEKS GESTATION OF PREGNANCY: Primary | ICD-10-CM

## 2017-09-22 DIAGNOSIS — O30.049 TWIN GESTATION, DICHORIONIC DIAMNIOTIC: ICD-10-CM

## 2017-09-22 DIAGNOSIS — J45.909 ASTHMA: ICD-10-CM

## 2017-09-22 DIAGNOSIS — O09.899 HISTORY OF PRETERM DELIVERY, CURRENTLY PREGNANT: ICD-10-CM

## 2017-09-22 DIAGNOSIS — Z98.891 HX OF CESAREAN SECTION: ICD-10-CM

## 2017-09-22 PROBLEM — O34.219 VBAC (VAGINAL BIRTH AFTER CESAREAN): Status: ACTIVE | Noted: 2017-09-22

## 2017-09-22 PROBLEM — Z3A.34 34 WEEKS GESTATION OF PREGNANCY: Status: RESOLVED | Noted: 2017-08-14 | Resolved: 2017-09-22

## 2017-09-22 LAB
ABO GROUP BLD: NORMAL
BASE EXCESS BLDCOA CALC-SCNC: -6.2 MMOL/L (ref 3–11)
BASE EXCESS BLDCOV CALC-SCNC: -6.1 MMOL/L (ref 1–9)
BASOPHILS # BLD AUTO: 0.01 THOUSANDS/ΜL (ref 0–0.1)
BASOPHILS NFR BLD AUTO: 0 % (ref 0–1)
BLD GP AB SCN SERPL QL: NEGATIVE
EOSINOPHIL # BLD AUTO: 0.04 THOUSAND/ΜL (ref 0–0.61)
EOSINOPHIL NFR BLD AUTO: 0 % (ref 0–6)
ERYTHROCYTE [DISTWIDTH] IN BLOOD BY AUTOMATED COUNT: 14.4 % (ref 11.6–15.1)
HCO3 BLDCOA-SCNC: 24.8 MMOL/L (ref 17.3–27.3)
HCO3 BLDCOV-SCNC: 21.2 MMOL/L (ref 12.2–28.6)
HCT VFR BLD AUTO: 35.7 % (ref 34.8–46.1)
HGB BLD-MCNC: 11.8 G/DL (ref 11.5–15.4)
LYMPHOCYTES # BLD AUTO: 3.65 THOUSANDS/ΜL (ref 0.6–4.47)
LYMPHOCYTES NFR BLD AUTO: 26 % (ref 14–44)
MCH RBC QN AUTO: 27.5 PG (ref 26.8–34.3)
MCHC RBC AUTO-ENTMCNC: 33.1 G/DL (ref 31.4–37.4)
MCV RBC AUTO: 83 FL (ref 82–98)
MONOCYTES # BLD AUTO: 0.84 THOUSAND/ΜL (ref 0.17–1.22)
MONOCYTES NFR BLD AUTO: 6 % (ref 4–12)
NEUTROPHILS # BLD AUTO: 9.29 THOUSANDS/ΜL (ref 1.85–7.62)
NEUTS SEG NFR BLD AUTO: 68 % (ref 43–75)
NRBC BLD AUTO-RTO: 0 /100 WBCS
O2 CT VFR BLDCOA CALC: 8 ML/DL
OXYHGB MFR BLDCOA: 38.3 %
OXYHGB MFR BLDCOV: 65.8 %
PCO2 BLDCOA: 76.2 MM[HG] (ref 30–60)
PCO2 BLDCOV: 48.9 MM HG (ref 27–43)
PH BLDCOA: 7.13 [PH] (ref 7.23–7.43)
PH BLDCOV: 7.25 [PH] (ref 7.19–7.49)
PLATELET # BLD AUTO: 285 THOUSANDS/UL (ref 149–390)
PMV BLD AUTO: 9.6 FL (ref 8.9–12.7)
PO2 BLDCOA: 22.9 MM HG (ref 5–25)
PO2 BLDCOV: 32.3 MM HG (ref 15–45)
RBC # BLD AUTO: 4.29 MILLION/UL (ref 3.81–5.12)
RH BLD: POSITIVE
SAO2 % BLDCOV: 13.2 ML/DL
SPECIMEN EXPIRATION DATE: NORMAL
WBC # BLD AUTO: 13.92 THOUSAND/UL (ref 4.31–10.16)

## 2017-09-22 PROCEDURE — 4A1HXCZ MONITORING OF PRODUCTS OF CONCEPTION, CARDIAC RATE, EXTERNAL APPROACH: ICD-10-PCS | Performed by: OBSTETRICS & GYNECOLOGY

## 2017-09-22 PROCEDURE — 86850 RBC ANTIBODY SCREEN: CPT | Performed by: STUDENT IN AN ORGANIZED HEALTH CARE EDUCATION/TRAINING PROGRAM

## 2017-09-22 PROCEDURE — 10907ZC DRAINAGE OF AMNIOTIC FLUID, THERAPEUTIC FROM PRODUCTS OF CONCEPTION, VIA NATURAL OR ARTIFICIAL OPENING: ICD-10-PCS | Performed by: OBSTETRICS & GYNECOLOGY

## 2017-09-22 PROCEDURE — 82805 BLOOD GASES W/O2 SATURATION: CPT | Performed by: OBSTETRICS & GYNECOLOGY

## 2017-09-22 PROCEDURE — 99214 OFFICE O/P EST MOD 30 MIN: CPT

## 2017-09-22 PROCEDURE — 86592 SYPHILIS TEST NON-TREP QUAL: CPT | Performed by: OBSTETRICS & GYNECOLOGY

## 2017-09-22 PROCEDURE — 86901 BLOOD TYPING SEROLOGIC RH(D): CPT | Performed by: STUDENT IN AN ORGANIZED HEALTH CARE EDUCATION/TRAINING PROGRAM

## 2017-09-22 PROCEDURE — 86900 BLOOD TYPING SEROLOGIC ABO: CPT | Performed by: STUDENT IN AN ORGANIZED HEALTH CARE EDUCATION/TRAINING PROGRAM

## 2017-09-22 PROCEDURE — 85025 COMPLETE CBC W/AUTO DIFF WBC: CPT | Performed by: STUDENT IN AN ORGANIZED HEALTH CARE EDUCATION/TRAINING PROGRAM

## 2017-09-22 RX ORDER — MORPHINE SULFATE 10 MG/ML
12 INJECTION, SOLUTION INTRAMUSCULAR; INTRAVENOUS ONCE
Status: DISCONTINUED | OUTPATIENT
Start: 2017-09-22 | End: 2017-09-22

## 2017-09-22 RX ORDER — BUTORPHANOL TARTRATE 1 MG/ML
1 INJECTION, SOLUTION INTRAMUSCULAR; INTRAVENOUS ONCE
Status: COMPLETED | OUTPATIENT
Start: 2017-09-22 | End: 2017-09-22

## 2017-09-22 RX ORDER — ONDANSETRON 2 MG/ML
4 INJECTION INTRAMUSCULAR; INTRAVENOUS EVERY 6 HOURS PRN
Status: DISCONTINUED | OUTPATIENT
Start: 2017-09-22 | End: 2017-09-23

## 2017-09-22 RX ORDER — ALBUTEROL SULFATE 90 UG/1
2 AEROSOL, METERED RESPIRATORY (INHALATION) EVERY 6 HOURS PRN
Status: DISCONTINUED | OUTPATIENT
Start: 2017-09-22 | End: 2017-09-23

## 2017-09-22 RX ORDER — SODIUM CHLORIDE, SODIUM LACTATE, POTASSIUM CHLORIDE, CALCIUM CHLORIDE 600; 310; 30; 20 MG/100ML; MG/100ML; MG/100ML; MG/100ML
125 INJECTION, SOLUTION INTRAVENOUS CONTINUOUS
Status: DISCONTINUED | OUTPATIENT
Start: 2017-09-22 | End: 2017-09-23

## 2017-09-22 RX ORDER — OXYTOCIN/RINGER'S LACTATE 30/500 ML
PLASTIC BAG, INJECTION (ML) INTRAVENOUS
Status: COMPLETED
Start: 2017-09-22 | End: 2017-09-22

## 2017-09-22 RX ORDER — LIDOCAINE HYDROCHLORIDE 10 MG/ML
INJECTION, SOLUTION EPIDURAL; INFILTRATION; INTRACAUDAL; PERINEURAL
Status: COMPLETED
Start: 2017-09-22 | End: 2017-09-22

## 2017-09-22 RX ADMIN — Medication: at 23:20

## 2017-09-22 RX ADMIN — SODIUM CHLORIDE, SODIUM LACTATE, POTASSIUM CHLORIDE, AND CALCIUM CHLORIDE 125 ML/HR: .6; .31; .03; .02 INJECTION, SOLUTION INTRAVENOUS at 20:40

## 2017-09-22 RX ADMIN — SODIUM CHLORIDE 5 MILLION UNITS: 0.9 INJECTION, SOLUTION INTRAVENOUS at 20:40

## 2017-09-22 RX ADMIN — LIDOCAINE HYDROCHLORIDE: 10 INJECTION, SOLUTION EPIDURAL; INFILTRATION; INTRACAUDAL; PERINEURAL at 23:20

## 2017-09-22 RX ADMIN — BUTORPHANOL TARTRATE 1 MG: 1 INJECTION, SOLUTION INTRAMUSCULAR; INTRAVENOUS at 21:24

## 2017-09-23 PROCEDURE — 0KQM0ZZ REPAIR PERINEUM MUSCLE, OPEN APPROACH: ICD-10-PCS | Performed by: OBSTETRICS & GYNECOLOGY

## 2017-09-23 RX ORDER — OXYTOCIN/RINGER'S LACTATE 30/500 ML
250 PLASTIC BAG, INJECTION (ML) INTRAVENOUS CONTINUOUS
Status: ACTIVE | OUTPATIENT
Start: 2017-09-23 | End: 2017-09-23

## 2017-09-23 RX ORDER — CALCIUM CARBONATE 200(500)MG
1000 TABLET,CHEWABLE ORAL DAILY PRN
Status: DISCONTINUED | OUTPATIENT
Start: 2017-09-23 | End: 2017-09-25 | Stop reason: HOSPADM

## 2017-09-23 RX ORDER — ACETAMINOPHEN 325 MG/1
650 TABLET ORAL EVERY 4 HOURS PRN
Status: DISCONTINUED | OUTPATIENT
Start: 2017-09-23 | End: 2017-09-25 | Stop reason: HOSPADM

## 2017-09-23 RX ORDER — DIPHENHYDRAMINE HCL 25 MG
25 TABLET ORAL EVERY 6 HOURS PRN
Status: DISCONTINUED | OUTPATIENT
Start: 2017-09-23 | End: 2017-09-25 | Stop reason: HOSPADM

## 2017-09-23 RX ORDER — OXYCODONE HYDROCHLORIDE AND ACETAMINOPHEN 5; 325 MG/1; MG/1
2 TABLET ORAL EVERY 4 HOURS PRN
Status: DISCONTINUED | OUTPATIENT
Start: 2017-09-23 | End: 2017-09-25 | Stop reason: HOSPADM

## 2017-09-23 RX ORDER — SIMETHICONE 80 MG
80 TABLET,CHEWABLE ORAL 4 TIMES DAILY PRN
Status: DISCONTINUED | OUTPATIENT
Start: 2017-09-23 | End: 2017-09-25 | Stop reason: HOSPADM

## 2017-09-23 RX ORDER — DOCUSATE SODIUM 100 MG/1
100 CAPSULE, LIQUID FILLED ORAL 2 TIMES DAILY
Status: DISCONTINUED | OUTPATIENT
Start: 2017-09-23 | End: 2017-09-25 | Stop reason: HOSPADM

## 2017-09-23 RX ORDER — IBUPROFEN 600 MG/1
600 TABLET ORAL EVERY 4 HOURS PRN
Status: DISCONTINUED | OUTPATIENT
Start: 2017-09-23 | End: 2017-09-25 | Stop reason: HOSPADM

## 2017-09-23 RX ORDER — OXYCODONE HYDROCHLORIDE AND ACETAMINOPHEN 5; 325 MG/1; MG/1
1 TABLET ORAL EVERY 4 HOURS PRN
Status: DISCONTINUED | OUTPATIENT
Start: 2017-09-23 | End: 2017-09-25 | Stop reason: HOSPADM

## 2017-09-23 RX ORDER — ONDANSETRON 2 MG/ML
4 INJECTION INTRAMUSCULAR; INTRAVENOUS EVERY 8 HOURS PRN
Status: DISCONTINUED | OUTPATIENT
Start: 2017-09-23 | End: 2017-09-25 | Stop reason: HOSPADM

## 2017-09-23 RX ORDER — MAGNESIUM HYDROXIDE/ALUMINUM HYDROXICE/SIMETHICONE 120; 1200; 1200 MG/30ML; MG/30ML; MG/30ML
15 SUSPENSION ORAL EVERY 6 HOURS PRN
Status: DISCONTINUED | OUTPATIENT
Start: 2017-09-23 | End: 2017-09-25 | Stop reason: HOSPADM

## 2017-09-23 RX ORDER — DIAPER,BRIEF,INFANT-TODD,DISP
1 EACH MISCELLANEOUS AS NEEDED
Status: DISCONTINUED | OUTPATIENT
Start: 2017-09-23 | End: 2017-09-25 | Stop reason: HOSPADM

## 2017-09-23 RX ADMIN — OXYCODONE HYDROCHLORIDE AND ACETAMINOPHEN 1 TABLET: 5; 325 TABLET ORAL at 01:38

## 2017-09-23 RX ADMIN — OXYCODONE HYDROCHLORIDE AND ACETAMINOPHEN 1 TABLET: 5; 325 TABLET ORAL at 18:23

## 2017-09-23 RX ADMIN — OXYCODONE HYDROCHLORIDE AND ACETAMINOPHEN 1 TABLET: 5; 325 TABLET ORAL at 11:20

## 2017-09-23 RX ADMIN — OXYCODONE HYDROCHLORIDE AND ACETAMINOPHEN 1 TABLET: 5; 325 TABLET ORAL at 23:19

## 2017-09-23 RX ADMIN — DOCUSATE SODIUM 100 MG: 100 CAPSULE, LIQUID FILLED ORAL at 17:19

## 2017-09-23 RX ADMIN — WITCH HAZEL 1 PAD: 500 SOLUTION RECTAL; TOPICAL at 00:19

## 2017-09-23 RX ADMIN — IBUPROFEN 600 MG: 600 TABLET, FILM COATED ORAL at 00:19

## 2017-09-23 RX ADMIN — HYDROCORTISONE 1 APPLICATION: 1 CREAM TOPICAL at 00:19

## 2017-09-23 RX ADMIN — DOCUSATE SODIUM 100 MG: 100 CAPSULE, LIQUID FILLED ORAL at 09:58

## 2017-09-23 RX ADMIN — BENZOCAINE AND MENTHOL: 20; .5 SPRAY TOPICAL at 00:19

## 2017-09-23 RX ADMIN — IBUPROFEN 600 MG: 600 TABLET, FILM COATED ORAL at 09:57

## 2017-09-24 VITALS
BODY MASS INDEX: 35.16 KG/M2 | HEART RATE: 97 BPM | HEIGHT: 65 IN | TEMPERATURE: 98.8 F | SYSTOLIC BLOOD PRESSURE: 110 MMHG | DIASTOLIC BLOOD PRESSURE: 72 MMHG | RESPIRATION RATE: 15 BRPM | WEIGHT: 211 LBS | OXYGEN SATURATION: 98 %

## 2017-09-24 RX ORDER — DOCUSATE SODIUM 100 MG/1
100 CAPSULE, LIQUID FILLED ORAL 2 TIMES DAILY
Qty: 10 CAPSULE | Refills: 0
Start: 2017-09-24 | End: 2019-03-29 | Stop reason: ALTCHOICE

## 2017-09-24 RX ORDER — IBUPROFEN 600 MG/1
600 TABLET ORAL EVERY 4 HOURS PRN
Qty: 30 TABLET | Refills: 0
Start: 2017-09-24 | End: 2019-03-29

## 2017-09-24 RX ORDER — DIAPER,BRIEF,INFANT-TODD,DISP
1 EACH MISCELLANEOUS AS NEEDED
Qty: 30 G | Refills: 0
Start: 2017-09-24

## 2017-09-24 RX ORDER — ACETAMINOPHEN 325 MG/1
TABLET ORAL
Qty: 30 TABLET | Refills: 0
Start: 2017-09-24 | End: 2019-06-18

## 2017-09-24 RX ADMIN — IBUPROFEN 600 MG: 600 TABLET, FILM COATED ORAL at 09:39

## 2017-09-24 RX ADMIN — OXYCODONE HYDROCHLORIDE AND ACETAMINOPHEN 1 TABLET: 5; 325 TABLET ORAL at 06:11

## 2017-09-24 RX ADMIN — DOCUSATE SODIUM 100 MG: 100 CAPSULE, LIQUID FILLED ORAL at 09:40

## 2017-09-24 RX ADMIN — DOCUSATE SODIUM 100 MG: 100 CAPSULE, LIQUID FILLED ORAL at 20:32

## 2017-09-24 RX ADMIN — IBUPROFEN 600 MG: 600 TABLET, FILM COATED ORAL at 20:32

## 2017-09-25 LAB — RPR SER QL: NORMAL

## 2017-10-02 LAB — PLACENTA IN STORAGE: NORMAL

## 2017-10-24 ENCOUNTER — ALLSCRIPTS OFFICE VISIT (OUTPATIENT)
Dept: OTHER | Facility: OTHER | Age: 32
End: 2017-10-24

## 2017-10-25 NOTE — PROGRESS NOTES
Assessment  1  Encounter for postpartum visit (V24 2) (Z39 2)   2  Contraceptive use education (V2 09) (Z30 09)    Plan  Contraceptive use education    · NuvaRing 0 12-0 015 MG/24HR Vaginal Ring; INSERT 1 RING VAGINALLY FOR 3  WEEKS THEN 1 WEEK OFF   Rx By: Velma Ann; Dispense: 30 Days ; #:1 Ring; Refill: 11; For: Contraceptive use education; JOSEFINA = N; Record    Discussion/Summary  Discussion Summary:   Sample of nuvaring given  also information on IUD  Goals and Barriers: The patient has the current Goals: Reliable contraception  The patent has the current Barriers: Decide between ring and IUD  Patient's Capacity to Self-Care: Patient is able to Self-Care  Medication SE Review and Pt Understands Tx: Possible side effects of new medications were reviewed with the patient/guardian today  The treatment plan was reviewed with the patient/guardian  The patient/guardian understands and agrees with the treatment plan   Self Referrals:   Self Referrals: No      History of Present Illness  Postpartum Follow-Up: The patient is being seen for postpartum follow up  The patient is status post vaginal delivery and   Delivery date was 17 and Dr Lombardo Conception  Last Pap test was done 3/23/17, neg/neg  The baby is a boy  The baby's name is Janey Brushri  Birth weight was 8 lbs, 6 oz  APGAR scores were 8 at one minute after birth and 9 at five minutes after birth  The baby is currently living at home  The baby is bottle feeding  The patient is currently asymptomatic  No associated symptoms are reported  Active Problems  1  Asthma (493 90) (J45 909)   2  History of  delivery, currently pregnant (654 20) (O34 219)   3  Tobacco use affecting pregnancy, antepartum (649 03) (O99 330)    Past Medical History  1  History of asthma (V12 69) (Z87 09)   2  History of premature delivery (V23 41) (Z87 51)   3  Normal delivery (650) (O80,Z37 9)   4   History of Twin pregnancy, antepartum condition or complication (943 83) (O30 009)    Surgical History  1  History of Cervical Loop Electrosurgical Excision (LEEP)   2  History of  Section Low Transverse   3  History of Surgically Induced  - By Dilation And Evacuation    Family History  Father    1  Family history of cardiac disorder (V17 49) (Z82 49)    Social History   · Former smoker (G91 54) (A16 983)    Current Meds   1  Haleigh 250 MG/ML Intramuscular Oil; INJECT 250  MG Intramuscular 250mg/ml  weekly   until 36 weeks gestation; Therapy: 2017 to Recorded   2  PNV OB+DHA 27-1 & 250 MG Oral Miscellaneous; Take 1 tablet daily; Therapy: 53OZR5977 to (Evaluate:2018)  Requested for: 18OUN8291; Last   Rx:2017 Ordered    Allergies  1  No Known Drug Allergies  2  No Known Environmental Allergies   3  No Known Food Allergies    Vitals  Vital Signs    Recorded: 00GWY1816 21:43JD   Systolic 767   Diastolic 84   Height 5 ft 5 in   Weight 202 lb    BMI Calculated 33 61   BSA Calculated 1 99   LMP PP     Physical Exam    Genitourinary   External genitalia: Normal and no lesions appreciated  Vagina: Normal, no lesions or dryness appreciated  Urethra: Normal     Urethral meatus: Normal     Bladder: Normal, soft, non-tender and no prolapse or masses appreciated  Cervix: Normal, no palpable masses  Uterus: Normal, non-tender, not enlarged, and no palpable masses  Adnexa/parametria: Normal, non-tender and no fullness or masses appreciated         Signatures   Electronically signed by : Sergio Menendez MD; Oct 24 2017  2:17PM EST                       (Author)

## 2018-01-09 NOTE — PROGRESS NOTES
2017         RE: Kevin Villegas Carpio                                  To: [facility]  MR#: [id number]                                    [address]   : [phone]   ENC: [id number] Melendez, [address]   (Exam #: [id number])                           Fax: [phone]      The LMP of this 36year old,  G6, P1-3-2-4 patient was DEC 15 2016, giving   her an HARJIT of SEP 24 2017 and a current gestational age of 12 weeks 1 day   by dates  A sonographic examination was performed on 2017 using   real time equipment  The ultrasound examination was performed using   abdominal & vaginal techniques  The patient has a BMI of 33 6  Her blood   pressure today was 132/60  Earliest ultrasound found in her record: 2/15/17   9w1d  17 HARJIT         Problem list   Hx of a 27 week PTD after PPROM in   Will get Haleigh weekly  Had   delivered at term with a prior pregnancy on [place] in   Baby weighed   7lb 7 oz  Hx of a prior CS   Hx of Twins in  that delivered at 36 weeks by CS  Cardiac motion was observed at 159 bpm       INDICATIONS      previous  delivery   obesity      Exam Types      Transvaginal      RESULTS      Fetus # 1 of 1   Breech presentation   Placenta Location = Anterior   No placenta previa   Placenta Grade = II      AMNIOTIC FLUID         Largest Vertical Pocket = 5 6 cm   Amniotic Fluid: Normal      CERVICAL EVALUATION      SUPINE      Cervical Length: 3 40 cm      OTHER TEST RESULTS           Funneling?: No             Dynamic Changes?: No        Resp  To TFP?: No                      Debris?: Yes      IMPRESSION      Hilton IUP   Breech presentation   Regular fetal heart rate of 159 bpm   Anterior placenta   No placenta previa      GENERAL COMMENT      When her [place] is approved she will start receiving it in our office   She   will need to be offered a quad screen at her next ob office on 4/19/17  RECOMMENDATION      Fetal Anatomic Survey: at 20 weeks   Transvaginal cervical length: at 20 weeks      TIO Carrillo M D     Maternal-Fetal Medicine   Electronically signed 04/14/17 19:28

## 2018-01-09 NOTE — CONSULTS
I had the pleasure of evaluating your patient, [facility]  My full evaluation follows:      Chief Complaint  Here for ultrasound study      History of Present Illness  Please refer to the ultrasound report for additional information  Active Problems    1  History of  delivery, currently pregnant in second trimester (V23 41) (O09 212)   2  Maternal obesity, antepartum, second trimester (649 13,278 00) (O99 212,E66 9)   3  Pregnancy, obstetrical care (V22 1) (Z34 90)    Past Medical History    · History of premature delivery (V23 41) (Z87 51)   · History of Twin pregnancy, antepartum condition or complication (797 89) (Q58 623)    Family History    · Family history of cardiac disorder (V17 49) (Z82 49)    Social History    · Former smoker () (P56 515)    Current Meds   1  Haleigh 250 MG/ML Intramuscular Oil; INJECT 250  MG Intramuscular 250mg/ml  weekly   until 36 weeks gestation; Therapy: 2017 to Recorded   2  PNV OB+DHA 27-1 & 250 MG Oral Miscellaneous; Take 1 tablet daily; Therapy: 57URN1923 to (Evaluate:2018)  Requested for: 91DQA5396; Last   Rx:2017 Ordered    Allergies    1  No Known Drug Allergies    2  No Known Environmental Allergies   3  No Known Food Allergies    Vitals   Recorded: 05LFJ5649 09:10BS   Systolic 806, LUE, Sitting   Diastolic 68, LUE, Sitting   BP CUFF SIZE Large   Height 5 ft 5 in   Weight 208 lb    BMI Calculated 34 61   BSA Calculated 2 01   Pain Scale 0     Results/Data  Exam description: transvaginal obstetrical ultrasound  Findings: Please refer to the ultrasound report for additional information  Discussion/Summary    Please refer to the ultrasound report for additional information  The patient was counseled regarding diagnostic results, instructions for management, prognosis, impressions  Thank you very much for allowing me to participate in the care of this patient  If you have any questions, please do not hesitate to contact me  Future Appointments    Signatures   Electronically signed by : ELIZABETH Hollingsworth ; May 19 2017  2:41PM EST                       (Author)

## 2018-01-10 NOTE — PROGRESS NOTES
MAY 2 2017         RE: Denzel Bartholomew                                  To: [facility]  MR#: [id number]                                    [address]   : [phone]   ENC: [id number]                             [place], [address]   (Exam #: [id number])                           Fax: [phone]      The LMP of this 36year old,  G6, P1-3-2-4 patient was DEC 15 2016, giving   her an HARJIT of SEP 24 2017 and a current gestational age of 24 weeks 5 days   by dates  A sonographic examination was performed on MAY 2 2017 using real   time equipment  The ultrasound examination was performed using abdominal   technique  The patient has a BMI of 33 8  Her blood pressure today was   113/73  Earliest ultrasound found in her record: 2/15/17   9w1d  17 HARJIT         Problem list   Hx of a 27 week PTD after PPROM in   Will get [place] weekly  Had   delivered at term with a prior pregnancy on Haleigh in   Baby weighed   7lb 7 oz  Hx of a prior CS   Hx of Twins in  that delivered at 36 weeks by CS        Cardiac motion was observed at 147 bpm       INDICATIONS      previous  delivery   obesity   fetal anatomical survey      Exam Types      LEVEL II   Transvaginal      RESULTS      Fetus # 1 of 1   Breech presentation   Fetal growth appeared normal   Placenta Location = Anterior   No placenta previa   Placenta Grade = I      MEASUREMENTS (* Included In Average GA)      AC              16 4 cm        21 weeks 1 day * (80%)   BPD              4 9 cm        20 weeks 6 days* (82%)   HC              18 7 cm        20 weeks 6 days* (80%)   Femur            3 4 cm        20 weeks 5 days* (61%)      Nuchal Fold      5 2 mm      Humerus          3 4 cm        21 weeks 4 days  (91%)      Cerebellum       2 2 cm        21 weeks 6 days   Biorbit          3 3 cm        21 weeks 1 day   CisternaMagna    5 6 mm      HC/AC           1 14 FL/AC           0 20   FL/BPD          0 68   EFW (Ac/Fl/Hc)   393 grams - 0 lbs 14 oz      THE AVERAGE GESTATIONAL AGE is 20 weeks 6 days +/- 10 days  AMNIOTIC FLUID         Largest Vertical Pocket = 5 1 cm   Amniotic Fluid: Normal      CERVICAL EVALUATION      SUPINE      Cervical Length: 3 60 cm      OTHER TEST RESULTS           Funneling?: No             Dynamic Changes?: No        Resp  To TFP?: No      ANATOMY      Head                                    Normal   Face/Neck                               See Details   Th  Cav  Normal   Heart                                   See Details   Abd  Cav  Normal   Stomach                                 Normal   Right Kidney                            Normal   Left Kidney                             Normal   Bladder                                 Normal   Abd  Wall                               Normal   Spine                                   Normal   Extrems                                 See Details   Genitalia                               Normal   Placenta                                Normal   Umbl  Cord                              Normal   Uterus                                  Normal   PCI                                     Normal      ANATOMY DETAILS      Visualized Appearing Sonographically Normal:   HEAD: (Calvarium, BPD Level, Cavum, Lateral Ventricles, Choroid Plexus,   Cerebellum, Cisterna Magna);    FACE/NECK: (Nuchal Fold, Orbits, Face);      TH  CAV  : (Lungs, Diaphragm); HEART: (Four Chamber View, Proximal Left   Outflow, Proximal Right Outflow, 3VV, 3 Vessel Trachea, Aortic Arch,   Interventricular Septum, Interatrial Septum, Cardiac Axis, Cardiac   Position);    ABD  CAV : (Liver);    STOMACH, RIGHT KIDNEY, LEFT KIDNEY,   BLADDER, ABD   WALL, SPINE: (Cervical Spine, Thoracic Spine, Lumbar Spine,   Sacrum);    EXTREMS: (Lt Humerus, Lt Forearm, Rt Forearm, Lt Hand, Lt   Femur, Rt Femur, Lt Low Leg, Rt Low Leg, Lt Foot, Rt Foot);    GENITALIA,   PLACENTA, UMBL  CORD, UTERUS, PCI      Not Visualized:   FACE/NECK: (Neck, Profile, Nose/Lips, Palate); HEART: (Short Axis of   Greater Vessels, Ductal Arch, IVC, SVC); EXTREMS: (Rt Humerus, Rt Hand)      ADNEXA      The left ovary appeared normal and measured 3 0 x 2 9 x 1 7 cm with a   volume of 7 7 cc  The right ovary appeared normal and measured 1 4 x 1 7 x   1 7 cm with a volume of 2 1 cc  IMPRESSION      Hilton IUP   20 weeks and 6 days by this ultrasound  (HARJIT=SEP 13 2017)   Breech presentation   Fetal growth appeared normal   Regular fetal heart rate of 147 bpm   Anterior placenta   No placenta previa      GENERAL COMMENT      On exam today the patient appears well, in no acute distress, and denies   any complaints  Her abdomen is non-tender  The patient has declined genetic screening, and we discussed that a normal   ultrasound does not exclude all congenital birth defects or karyotypic   abnormalities  Today's scan is limited by fetal position; therefore, the fetal anatomic   survey could not be completed  No significant abnormalities are   appreciated or suspected  Good fetal movement and tone are seen  The   amniotic fluid volume appears normal   The placenta is anterior and it   appears sonographically normal   A transvaginal ultrasound was performed   to assess the cervix, which was not seen well transabdominally  The   cervical length was 3 6 centimeters, which is normal for the current   gestational age  There was no significant funneling or dynamic changes   appreciated  The limitations of ultrasound were reviewed with the patient,   which she appears to understand and accepts  She was informed of today's   findings and all of her questions were answered        We discussed appropriate follow-up and I recommend the patient return in 2   weeks for a transvaginal cervical length evaluation and to complete the   anatomic survey  Please note, in addition to the time spent discussing the results of the   ultrasound, I spent approximately 10 minutes of face-to-face time with the   patient, greater than 50% of which was spent in counseling and the   coordination of care for this patient  Thank you very much for allowing us to participate in the care of this   very nice patient  Should you have any questions, please do not hesitate   to contact our office  TIO Mustafa M D  Electronically signed 05/02/17 11:16           Electronically signed by:Sam HILARIO    May  3 2017 11:15AM EST

## 2018-01-10 NOTE — PROGRESS NOTES
2017         RE: Winnebagoadenike Ferguson                                  To: [facility]  MR#: [id number]                                    [address]   : [address] [facility] [address]   ENC: [id number]                             [address]   (Exam #: [id number])                           Fax: [phone]      The LMP of this 36year old,  G6, P1-3-2-4 patient was DEC 15 2016, giving   her an HARJIT of SEP 21 2017 and a current gestational age of 35 weeks 1 day   by dates  A sonographic examination was performed on 2017 using   real time equipment  The ultrasound examination was performed using   abdominal technique  The patient has a BMI of 34 3  Her blood pressure   today was 108/68  Earliest ultrasound found in her record: 2/15/17   9w1d  17 HARJIT            Scott Iverson has no complaints today  She reports regular fetal movement and   denies problems related to vaginal bleeding or  labor  She remains   treated with weekly IM progesterone given her history of a prior   spontaneous  birth  Cardiac motion was observed at 144 bpm       INDICATIONS      previous  delivery   Evaluate missed anatomy   obesity      Exam Types      Level I      RESULTS      Fetus # 1 of 1   Vertex presentation   Fetal growth appeared normal   Placenta Location = Anterior   No placenta previa   Placenta Grade = II      MEASUREMENTS (* Included In Average GA)      AC              25 0 cm        29 weeks 1 day * (67%)   BPD              7 1 cm        28 weeks 4 days* (50%)   HC              26 7 cm        28 weeks 6 days* (52%)   Femur            5 6 cm        29 weeks 4 days* (66%)      HC/AC           1 07   FL/AC           0 22   FL/BPD          0 79   EFW (Ac/Fl/Hc)  1365 grams - 3 lbs 0 oz                 (67%)      THE AVERAGE GESTATIONAL AGE is 29 weeks 0 days +/- 18 days        AMNIOTIC FLUID      Q1: 7 2      Q2: 2 4      Q3: 7 5      Q4: 5 3   MADHAVI Total = 22 3 cm   Amniotic Fluid: Normal      ANATOMY DETAILS      Visualized Appearing Sonographically Normal:   HEAD: (Calvarium, BPD Level);    FACE/NECK: (Profile, Nose/Lips);    TH    CAV : (Diaphragm); HEART: ([employer], [place]   Vessels, Ductal Arch, IVC, SVC, Cardiac Axis, Cardiac Position);      STOMACH, RIGHT KIDNEY, LEFT KIDNEY, BLADDER, EXTREMS: (Rt Forearm, Rt   Hand);    PLACENTA      Suboptimally Visualized:   FACE/NECK: (Palate)      Not Visualized:   HEAD: (Cavum, Lateral Ventricles, Cerebellum);    FACE/NECK: (Neck); HEART: (Proximal Left Outflow, Proximal Right Outflow)      IMPRESSION      [place] IUP   29 weeks and 0 days by this ultrasound  (HARJIT=SEP 15 2017)   Vertex presentation   Fetal growth appeared normal   Regular fetal heart rate of 144 bpm   Anterior placenta   No placenta previa      GENERAL COMMENT      No fetal structural abnormality is identified on the Level I survey today  The palate, neck, and distal right upper extremity remain suboptimally   imaged secondary to the constraints related to unfavorable fetal position   and maternal body habitus  Fetal interval growth and amniotic fluid   volume are normal       Gustavo Avelar was given an IM injection of 250 mg of 17-OHPC at her visit today  Today's ultrasound findings and suggested follow-up were discussed in   detail with Edel  She will return to the [facility]  at about 36   weeks gestation to assess interval growth  Continuation of weekly 17-OHPC   is recommended through 36 completed weeks gestation  Daily third trimester   fetal kick counting was discussed at the visit today  The face to face time, in addition to time spent discussing ultrasound   results, was approximately 10 minutes, greater than 50% of which was spent   during counseling and coordination of care  TIO Lopez M D     Maternal-Fetal Medicine Electronically signed 06/30/17 18:31

## 2018-01-10 NOTE — MISCELLANEOUS
Message   Recorded as Task   Date: 2017 10:20 AM, Created By: Pavel Bustillo   Task Name: Follow Up   Assigned To: Jan Ramirez   Regarding Patient: Angelia Lacy, Status: In Progress   MessiKeerthi Cassie - 22 Mar 2017 10:20 AM     TASK CREATED  check 28 week labs   Mayela Mail - 2017 11:43 AM     TASK EDITED  spoke with patient, will  new order in [facility] this afternoon   Crissy Coreas - 2017 10:56 AM     TASK EDITED  Spoke with patient today2017  and she said that she didn't go for her 1 hour glucose test due to she did not have a car to get to the lab but that she will have someone take to get it done she is over 30 weeks gestation already  Eric Anderson - 2017 2:21 PM     TASK REASSIGNED: Previously Assigned To Callie Gilford - 2017 3:24 PM     TASK EDITED  I spoke with the patient this morning and she said that she will go to get her blood soon she just didn't have a car  Crissy Coreas - 2017 3:24 PM     TASK IN PROGRESS   Nicole Sanchez - 2017 10:51 AM     TASK REASSIGNED: Previously Assigned To [facility],Team  Pt's phone number is out of service, Please send certified letter we need new phone number and we need pt to go for her 28week labs that she did not go for yet  Thank you very much   Jan Ramirez - 2017 1:12 PM     TASK EDITED  Certified letter sent        Active Problems    1  Asthma (493 90) (J45 909)   2  Encounter for fetal anatomic survey (V2 81) (Z36)   3  History of  delivery, currently pregnant (654 20) (O34 219)   4  History of  delivery, currently pregnant in second trimester (V23 41) (O09 212)   5  History of  delivery, currently pregnant in third trimester (V23 41) (O09 213)   6  Maternal obesity, antepartum, second trimester (649 13,278 00) (G79 314)   7  Maternal obesity, antepartum, third trimester (649 13,278 00) (X66 621)   8   Pregnancy complicated by noncompliance, antepartum (V23 89,V15 81)   (O09 899,Z91 19)   9  Supervision of high risk pregnancy in second trimester (V23 9) (O09 92)   10  Tobacco use affecting pregnancy, antepartum (649 03) (O99 330)    Current Meds   1  [place] 250 MG/ML Intramuscular Oil (Hydroxyprogesterone Caproate); INJECT 250    MG Intramuscular 250mg/ml  weekly until 36 weeks gestation; Therapy: 01Apr2017 to Recorded   2  PNV OB+DHA 27-1 & 250 MG Oral Miscellaneous; Take 1 tablet daily; Therapy: 50HUF0031 to (Evaluate:18Mar2018)  Requested for: 83NEH3160; Last   Rx:23Mar2017 Ordered    Allergies    1  No Known Drug Allergies    2  No Known Environmental Allergies   3  No Known Food Allergies    Signatures   Electronically signed by :  Maurice Ricks, ; Jul 24 2017  1:12PM EST                       (Author)

## 2018-01-10 NOTE — MISCELLANEOUS
Message  Pt called office today, spoke with   Pt informed  that she currently is in labor , on her way to [address] Delivery unit  Pt's HARJIT is 17, GA 40-1  MA contacted Dr Wandy Barger (physician on call) , informed him that Pt was on her way to labor & delivery [facility] location)  MA contacted labor and delivery, informed L& D staff Pt is on her way to hospital to deliver  Active Problems    1  Asthma (493 90) (J45 909)   2  History of  delivery, currently pregnant (654 20) (O34 219)   3  History of  delivery, currently pregnant in third trimester (V23 41) (O09 213)   4  Irregular bleeding (626 4) (N92 6)   5  Maternal obesity, antepartum, third trimester (649 13,278 00) (B47 861)   6  Need for Tdap vaccination (V06 1) (Z23)   7  Pregnancy complicated by noncompliance, antepartum (V23 89,V15 81)   (O09 899,Z91 19)   8  Pregnancy, obstetrical care (V22 1) (Z34 90)   9  Supervision of high risk pregnancy in second trimester (V23 9) (O09 92)   10  Tobacco use affecting pregnancy, antepartum (649 03) (O99 330)    Current Meds   1  [place] 250 MG/ML Intramuscular Oil (Hydroxyprogesterone Caproate); INJECT 250    MG Intramuscular 250mg/ml  weekly until 36 weeks gestation; Therapy: 2017 to Recorded   2  PNV OB+DHA 27-1 & 250 MG Oral Miscellaneous; Take 1 tablet daily; Therapy: 83WPM1601 to (Evaluate:2018)  Requested for: 65XDK3714; Last   Rx:2017 Ordered    Allergies    1  No Known Drug Allergies    2  No Known Environmental Allergies   3   No Known Food Allergies    Signatures   Electronically signed by : Juanis Barbosa MA; Sep 22 2017 12:44PM EST                       (Author)

## 2018-01-11 NOTE — MISCELLANEOUS
Message  Left message on pt cell phone to contact [facility] to set up appt  for [place] injection at [address] office  Meds shipped to [address] site  Pt to contact [facility] if questions  Active Problems    1  History of  delivery, currently pregnant in second trimester (V23 41) (O09 212)   2  Maternal obesity, antepartum, second trimester (649 13,278 00) (O99 212,E66 9)   3  Pregnancy, obstetrical care (V22 1) (Z34 90)    Current Meds   1  [place] 250 MG/ML Intramuscular Oil (Hydroxyprogesterone Caproate); INJECT 250    MG Intramuscular 250mg/ml  weekly until 36 weeks gestation; Therapy: 2017 to Recorded   2  PNV OB+DHA 27-1 & 250 MG Oral Miscellaneous; Take 1 tablet daily; Therapy: 77EWA0183 to (Evaluate:2018)  Requested for: 96IQT5875; Last   Rx:2017 Ordered    Allergies    1  No Known Drug Allergies    2  No Known Environmental Allergies   3   No Known Food Allergies    Signatures   Electronically signed by : Ana Tam RN; 2017 10:26AM EST                       (Author)

## 2018-01-12 VITALS
HEIGHT: 65 IN | SYSTOLIC BLOOD PRESSURE: 134 MMHG | DIASTOLIC BLOOD PRESSURE: 84 MMHG | WEIGHT: 202 LBS | BODY MASS INDEX: 33.66 KG/M2

## 2018-01-12 VITALS
DIASTOLIC BLOOD PRESSURE: 73 MMHG | SYSTOLIC BLOOD PRESSURE: 108 MMHG | WEIGHT: 211.03 LBS | BODY MASS INDEX: 35.16 KG/M2 | HEIGHT: 65 IN

## 2018-01-12 VITALS
WEIGHT: 201.2 LBS | BODY MASS INDEX: 33.52 KG/M2 | DIASTOLIC BLOOD PRESSURE: 58 MMHG | SYSTOLIC BLOOD PRESSURE: 122 MMHG | HEIGHT: 65 IN

## 2018-01-12 VITALS
BODY MASS INDEX: 34.47 KG/M2 | HEIGHT: 65 IN | DIASTOLIC BLOOD PRESSURE: 66 MMHG | WEIGHT: 206.9 LBS | SYSTOLIC BLOOD PRESSURE: 109 MMHG

## 2018-01-13 VITALS
WEIGHT: 208.4 LBS | BODY MASS INDEX: 34.72 KG/M2 | HEIGHT: 65 IN | DIASTOLIC BLOOD PRESSURE: 65 MMHG | SYSTOLIC BLOOD PRESSURE: 136 MMHG

## 2018-01-13 VITALS
HEIGHT: 65 IN | BODY MASS INDEX: 34.75 KG/M2 | WEIGHT: 208.6 LBS | DIASTOLIC BLOOD PRESSURE: 64 MMHG | SYSTOLIC BLOOD PRESSURE: 104 MMHG

## 2018-01-13 VITALS
BODY MASS INDEX: 34.35 KG/M2 | SYSTOLIC BLOOD PRESSURE: 105 MMHG | WEIGHT: 206.2 LBS | HEIGHT: 65 IN | DIASTOLIC BLOOD PRESSURE: 71 MMHG

## 2018-01-13 VITALS
WEIGHT: 208 LBS | DIASTOLIC BLOOD PRESSURE: 68 MMHG | HEIGHT: 65 IN | BODY MASS INDEX: 34.66 KG/M2 | SYSTOLIC BLOOD PRESSURE: 101 MMHG

## 2018-01-13 VITALS
SYSTOLIC BLOOD PRESSURE: 113 MMHG | WEIGHT: 209.01 LBS | BODY MASS INDEX: 34.82 KG/M2 | HEIGHT: 65 IN | DIASTOLIC BLOOD PRESSURE: 71 MMHG

## 2018-01-13 VITALS
DIASTOLIC BLOOD PRESSURE: 70 MMHG | BODY MASS INDEX: 33.49 KG/M2 | WEIGHT: 201 LBS | SYSTOLIC BLOOD PRESSURE: 110 MMHG | HEIGHT: 65 IN

## 2018-01-13 NOTE — MISCELLANEOUS
Message   Recorded as Task   Date: 03/23/2017 04:17 PM, Created By: Charles   Task Name: Follow Up   Assigned To: Morena Stewart   Regarding Patient: Marilyn Rubio, Status: Active   CommentKitty Busing - 23 Mar 2017 4:17 PM     TASK CREATED  "Maite" from [facility] called  States the PNV is not covered under pts insurance  She also checked all other generics with her insurance and none were covered  Pt will have to call insurance to find out what brand will be covered vs   samples in the office  George Tovar - 23 Mar 2017 4:46 PM     TASK IN PROGRESS   [place] - 24 Mar 2017 1:26 PM     TASK EDITED   Charles - 24 Mar 2017 1:26 PM     TASK EDITED   Phillis Severin - 24 Mar 2017 1:34 PM     TASK EDITED  Patient has been notified that insurance does not cover the brands or generics of PNV that [facility] carries  She will check with her insurance and contact us  The OTC she was taking was very constipating  Active Problems    1  Pregnancy, obstetrical care (V22 1) (Z34 90)   2  Twin Gestation Placenta Status Dichorionic, Diamniotic (V91 03)   3  Twin pregnancy, antepartum condition or complication (408 24) (F34 196)   4  Visit: Prenatal Exam High-risk W/ History Of Pre-term Labor (V23 41)    Current Meds   1  PNV OB+DHA 27-1 & 250 MG Oral Miscellaneous; Take 1 tablet daily; Therapy: 33GHY5794 to (Evaluate:18Mar2018)  Requested for: 05SJY8915; Last   Rx:23Mar2017 Ordered    Allergies    1  No Known Drug Allergies    2  No Known Environmental Allergies   3   No Known Food Allergies    Signatures   Electronically signed by : Cyndi Felix, ; Mar 24 2017  1:35PM EST                       (Author)

## 2018-01-13 NOTE — MISCELLANEOUS
Message  I tried calling patient again about setting up appointments  Her voice mail goes directly to a message that says your call cannot be completed as dialed  She never called to set up her OB appointments  Active Problems    1  Twin Gestation Placenta Status Dichorionic, Diamniotic (V91 03)   2  Twin pregnancy, antepartum condition or complication (166 00) (V83 152)   3  Visit: Prenatal Exam High-risk W/ History Of Pre-term Labor (V23 41)    Current Meds   1  No Reported Medications Recorded    Allergies    1  No Known Drug Allergies    2  No Known Environmental Allergies   3   No Known Food Allergies    Signatures   Electronically signed by : Huber Arboleda, ; Mar 15 2017  3:43PM EST                       (Author)

## 2018-01-13 NOTE — MISCELLANEOUS
Message   Recorded as Task   Date: 2017 10:16 AM, Created By: Cooper Carlos   Task Name: Medical Complaint Callback   Assigned To: Cathy Roma   Regarding Patient: Brina Escobar, Status: In Progress   Comment:    Elissa Figueredo - 20 Sep 2017 10:16 AM     TASK CREATED  Caller: Self; Medical Complaint; [phone] (Home)  Pt calling to ask about being induced   pt states she is 40w  Pt is very uncomfortable and constipated  Henrry Bender - 20 Sep 2017 10:21 AM     TASK IN PROGRESS   Henrry Bender - 20 Sep 2017 10:38 AM     TASK EDITED  Spoke with pt  She is very anxious to deliver  States she has been constipated x4 days and is very uncomfortable  Advised mirilax, Colace, glycerin suppositories  Discussed electing IOL at 41 wks if favorable  Pt requests PNAT apt today to discuss further  Pt will see A L  today in urg  Active Problems    1  Asthma (493 90) (J45 909)   2  History of  delivery, currently pregnant (654 20) (O34 219)   3  History of  delivery, currently pregnant in third trimester (V23 41) (O09 213)   4  Irregular bleeding (626 4) (N92 6)   5  Maternal obesity, antepartum, third trimester (649 13,278 00) (M35 379)   6  Need for Tdap vaccination (V06 1) (Z23)   7  Pregnancy complicated by noncompliance, antepartum (V23 89,V15 81)   (O09 899,Z91 19)   8  Pregnancy, obstetrical care (V22 1) (Z34 90)   9  Supervision of high risk pregnancy in second trimester (V23 9) (O09 92)   10  Tobacco use affecting pregnancy, antepartum (649 03) (O99 330)    Current Meds   1  [place] 250 MG/ML Intramuscular Oil (Hydroxyprogesterone Caproate); INJECT 250    MG Intramuscular 250mg/ml  weekly until 36 weeks gestation; Therapy: 2017 to Recorded   2  PNV OB+DHA 27-1 & 250 MG Oral Miscellaneous; Take 1 tablet daily; Therapy: 22MHC6337 to (Evaluate:2018)  Requested for: 86VTF2591; Last   Rx:2017 Ordered    Allergies    1  No Known Drug Allergies    2   No Known Environmental Allergies   3   No Known Food Allergies    Signatures   Electronically signed by : Ifrah Villanueva, ; Sep 20 2017 10:38AM EST                       (Author)

## 2018-01-13 NOTE — MISCELLANEOUS
Message  Patient has not called to set up OB appts as intake form indicated she would be doing  I called today to follow up, her number went directly to a recording that said "call could not be completed as dialed " I tried numerous times  Active Problems    1  Twin Gestation Placenta Status Dichorionic, Diamniotic (V91 03)   2  Twin pregnancy, antepartum condition or complication (079 20) (T03 464)   3  Visit: Prenatal Exam High-risk W/ History Of Pre-term Labor (V23 41)    Current Meds   1  No Reported Medications Recorded    Allergies    1  No Known Drug Allergies    2  No Known Environmental Allergies   3   No Known Food Allergies    Signatures   Electronically signed by : Minesh Woods, ; Feb 27 2017 10:36AM EST                       (Author)

## 2018-01-14 VITALS
WEIGHT: 206.01 LBS | DIASTOLIC BLOOD PRESSURE: 68 MMHG | SYSTOLIC BLOOD PRESSURE: 108 MMHG | BODY MASS INDEX: 34.32 KG/M2 | HEIGHT: 65 IN

## 2018-01-14 VITALS
WEIGHT: 203.8 LBS | DIASTOLIC BLOOD PRESSURE: 73 MMHG | HEIGHT: 65 IN | SYSTOLIC BLOOD PRESSURE: 113 MMHG | BODY MASS INDEX: 33.95 KG/M2

## 2018-01-14 VITALS
BODY MASS INDEX: 33.99 KG/M2 | DIASTOLIC BLOOD PRESSURE: 69 MMHG | WEIGHT: 204 LBS | HEIGHT: 65 IN | SYSTOLIC BLOOD PRESSURE: 116 MMHG

## 2018-01-14 VITALS
WEIGHT: 208 LBS | BODY MASS INDEX: 34.66 KG/M2 | HEIGHT: 65 IN | DIASTOLIC BLOOD PRESSURE: 69 MMHG | SYSTOLIC BLOOD PRESSURE: 113 MMHG

## 2018-01-14 VITALS
BODY MASS INDEX: 33.66 KG/M2 | SYSTOLIC BLOOD PRESSURE: 132 MMHG | HEIGHT: 65 IN | DIASTOLIC BLOOD PRESSURE: 60 MMHG | WEIGHT: 202 LBS

## 2018-01-14 NOTE — CONSULTS
[facility]  My full evaluation follows:      Chief Complaint  Here for ultrasound study      History of Present Illness  Please refer to the ultrasound report for additional information  Active Problems    1  Asthma (493 90) (J45 909)   2  History of  delivery, currently pregnant (654 20) (O34 219)   3  History of  delivery, currently pregnant in third trimester (V23 41) (O09 213)   4  Maternal obesity, antepartum, third trimester (649 13,278 00) (T41 915)   5  Pregnancy complicated by noncompliance, antepartum (V23 89,V15 81)   (O09 899,Z91 19)   6  Supervision of high risk pregnancy in second trimester (V23 9) (O09 92)   7  Tobacco use affecting pregnancy, antepartum (649 03) (O99 330)    Past Medical History    · History of asthma (V12 69) (Z87 09)   · History of premature delivery (V23 41) (Z87 51)   · Normal delivery (650) (O80,Z37  9)   · History of Twin pregnancy, antepartum condition or complication (844 81) (L49 435)    Surgical History    · History of Cervical Loop Electrosurgical Excision (LEEP)   · History of  Section Low Transverse   · History of Surgically Induced  - By Dilation And Evacuation    Family History    · Family history of cardiac disorder (V17 49) (Z82 49)    Social History    · Former smoker (V15 82) (H06 862)    Current Meds   1  [place] 250 MG/ML Intramuscular Oil; INJECT 250  MG Intramuscular 250mg/ml  weekly   until 36 weeks gestation; Therapy: 2017 to Recorded   2  PNV OB+DHA 27-1 & 250 MG Oral Miscellaneous; Take 1 tablet daily; Therapy: 25RJL4969 to (Evaluate:2018)  Requested for: 90OJH1165; Last   Rx:2017 Ordered    Allergies    1  No Known Drug Allergies    2  No Known Environmental Allergies   3   No Known Food Allergies    Vitals   Recorded: 63MZR8862 50:21AV   Systolic 563, RUE, Sitting   Diastolic 71, RUE, Sitting   Height 5 ft 5 in   Weight 209 lb 0 2 oz   BMI Calculated 34 78   BSA Calculated 2 02   Pain Scale 0     Results/Data  Exam description: level I obstetrical ultrasound  Findings: Please refer to the ultrasound report for additional information  Discussion/Summary    Please refer to the ultrasound report for additional information  The patient was counseled regarding diagnostic results, instructions for management, prognosis, impressions  Thank you very much for allowing me to participate in the care of this patient  If you have any questions, please do not hesitate to contact me        Future Appointments    Signatures   Electronically signed by : ELIZABETH Zimmer ; Aug 18 2017  1:31PM EST                       (Author)

## 2018-01-15 VITALS
BODY MASS INDEX: 34.82 KG/M2 | HEIGHT: 65 IN | SYSTOLIC BLOOD PRESSURE: 98 MMHG | WEIGHT: 209 LBS | DIASTOLIC BLOOD PRESSURE: 67 MMHG

## 2018-01-15 VITALS
BODY MASS INDEX: 34.52 KG/M2 | HEIGHT: 65 IN | SYSTOLIC BLOOD PRESSURE: 123 MMHG | DIASTOLIC BLOOD PRESSURE: 64 MMHG | WEIGHT: 207.2 LBS

## 2018-01-15 NOTE — MISCELLANEOUS
Message  Approved specialty pharmacy for Apptentive is [facility] phone number [phone]  Active Problems    1  Asthma (493 90) (J45 463)   2  Encounter for fetal anatomic survey (V28 81) (Z36)   3  History of  delivery, currently pregnant (654 20) (O34 219)   4  History of  delivery, currently pregnant in second trimester (V23 41) (O09 212)   5  Maternal obesity, antepartum, second trimester (649 13,278 00) (O99 212,E66 9)   6  Pregnancy complicated by noncompliance, antepartum (V23 89,V15 81)   (O09 899,Z91 19)   7  Supervision of high risk pregnancy in second trimester (V23 9) (O09 92)   8  Tobacco use affecting pregnancy, antepartum (649 03) (O99 330,F17 290)    Current Meds   1  [place] 250 MG/ML Intramuscular Oil (Hydroxyprogesterone Caproate); INJECT 250    MG Intramuscular 250mg/ml  weekly until 36 weeks gestation; Therapy: 2017 to Recorded   2  PNV OB+DHA 27-1 & 250 MG Oral Miscellaneous; Take 1 tablet daily; Therapy: 43VIH4875 to (Evaluate:2018)  Requested for: 31EFH3281; Last   Rx:2017 Ordered    Allergies    1  No Known Drug Allergies    2  No Known Environmental Allergies   3   No Known Food Allergies    Signatures   Electronically signed by : Guillermo Maria RN; 2017  9:40AM EST                       (Author)

## 2018-01-15 NOTE — PROGRESS NOTES
Chief Complaint  Patient here for her T-DAP Vaccine today 2017 lot #[id number] Expires 2019 NCD #[id number] given to her on her left deltoid without any complications  Active Problems    1  Asthma (493 90) (J45 909)   2  History of  delivery, currently pregnant (654 20) (O34 219)   3  History of  delivery, currently pregnant in third trimester (V23 41) (O09 213)   4  Maternal obesity, antepartum, third trimester (649 13,278 00) (N12 200)   5  Need for Tdap vaccination (V06 1) (Z23)   6  Pregnancy complicated by noncompliance, antepartum (V23 89,V15 81)   (O09 899,Z91 19)   7  Pregnancy, obstetrical care (V22 1) (Z34 90)   8  Supervision of high risk pregnancy in second trimester (V23 9) (O09 92)   9  Tobacco use affecting pregnancy, antepartum (649 03) (O99 330)    Current Meds   1  [place] 250 MG/ML Intramuscular Oil; INJECT 250  MG Intramuscular 250mg/ml    weekly until 36 weeks gestation; Therapy: 2017 to Recorded   2  PNV OB+DHA 27-1 & 250 MG Oral Miscellaneous; Take 1 tablet daily; Therapy: 45HUE3359 to (Evaluate:2018)  Requested for: 43CNV4238; Last   Rx:2017 Ordered    Allergies    1  No Known Drug Allergies    2  No Known Environmental Allergies   3  No Known Food Allergies    Vitals  Signs    Systolic: 537, RUE, Sitting  Diastolic: 62, RUE, Sitting  Weight: 211 lb   BMI Calculated: 35 11  BSA Calculated: 2 02    Assessment    1  Irregular bleeding (626 4) (N92 6)    Plan  Irregular bleeding    · (1) TSH; Status:Canceled;    · * US PELVIS COMPLETE (TRANSABDOMINAL AND TRANSVAGINAL); Status:Canceled  - Scheduling;   Pregnancy, obstetrical care    · (1) GROUP B STREP, MOLECULAR; Status: In Progress - Specimen/Data  Collected,Retrospective By Protocol Authorization;   Done: 76KMD2595  patient have a Penicillin Allergy: : No    Signatures   Electronically signed by : Alexandria Hayes, [facility];  Aug 28 2017  5:01PM EST                       (Author)    Electronically signed by : ELIZABETH Brody ; Aug 28 2017  5:12PM EST

## 2018-01-15 NOTE — PROGRESS NOTES
AUG 18 2017         RE: Aydee Brothers                                  To: [facility]  MR#: [id number]                                    [address]   : [address]   ENC: [id number]:MANUEL Melendez, [address]   (Exam #: [id number])                           Fax: [phone]      The LMP of this 29year old,  G6, P1-3-2-4 patient was DEC 15 2016, giving   her an HARJIT of SEP 21 2017 and a current gestational age of 27 weeks 1 day   by dates  A sonographic examination was performed on AUG 18 2017 using   real time equipment  The ultrasound examination was performed using   abdominal technique  The patient has a BMI of 34 8  Her blood pressure   today was 113/71  Earliest ultrasound found in her record: 2/15/17   9w1d  17 HARJIT            Kathleen Rdz has no complaints today  She denies vaginal bleeding or leakage of   amniotic fluid from the vagina  She tells me that a recent cervical exam   revealed her to be 4 cm dilated  She continues to be treated with weekly   IM progesterone given her history of a prior spontaneous  birth  Kathleen Rdz continues to desire TOLAC as an option        Cardiac motion was observed at 162 bpm       INDICATIONS      previous  delivery   obesity      Exam Types      Level I      RESULTS      Fetus # 1 of 1   Variable presentation   Fetal growth appeared normal   Placenta Location = Anterior   No placenta previa   Placenta Grade = II      MEASUREMENTS (* Included In Average GA)      AC              32 6 cm        36 weeks 5 days* (78%)   BPD              8 9 cm        35 weeks 6 days* (64%)   HC              32 7 cm        36 weeks 4 days* (61%)   Femur            6 7 cm        34 weeks 2 days* (45%)      HC/AC           1 00   FL/AC           0 21   FL/BPD          0 76   EFW (Ac/Fl/Hc)  2828 grams - 6 lbs 4 oz                 (61%)      THE AVERAGE GESTATIONAL AGE is 35 weeks 6 days +/- 21 days  AMNIOTIC FLUID      Q1: 3 6      Q2: 4 3      Q3:          Q4: 4 9   MADHAVI Total = 12 8 cm   Amniotic Fluid: Normal      ANATOMY DETAILS      Visualized Appearing Sonographically Normal:   HEAD: (Calvarium, BPD Level, Cavum, Lateral Ventricles);    TH  CAV :   (Diaphragm); HEART: (Four AutoBike);    STOMACH, RIGHT KIDNEY, LEFT   KIDNEY, BLADDER, PLACENTA      Not Visualized:   HEAD: (Cerebellum, Cisterna Magna); HEART: (Proximal Left Outflow,   Proximal Right Outflow)      IMPRESSION      Hilton IUP   35 weeks and 6 days by this ultrasound  (HARJIT=SEP 16 2017)   Variable presentation   Fetal growth appeared normal   Regular fetal heart rate of 162 bpm   Anterior placenta   No placenta previa      GENERAL COMMENT      No fetal structural abnormality is identified on the Level I survey today   in a limited study secondary to the constraints related to the late   gestational age  Fetal interval growth and amniotic fluid volume are   normal       José Luis Santos was given an IM injection of 250 mg of 17-OHPC at her visit today  Today's ultrasound findings and suggested follow-up were discussed in   detail with Edel  She will return to the [facility]  in one week   for a final progesterone injection  No further appointment has been   scheduled in the [facility]  for José Luis Santos at this time  Follow-up M   ultrasound evaluation is recommended as clinically indicated  The face to face time, in addition to time spent discussing ultrasound   results, was approximately 10 minutes, greater than 50% of which was spent   during counseling and coordination of care  TIO Pérez M D     Maternal-Fetal Medicine   Electronically signed 08/18/17 13:36

## 2018-01-16 NOTE — PROGRESS NOTES
MAR 31 2017         RE: Clarissa Ballesteros                                  To: [facility]  MR#: [id number]                                    [address]   : [address]   ENC: [id number]:[facility]                             [address]   (Exam #: [id number])                           Fax: [phone]      The LMP of this 36year old,  G6, P1-3-2-4 patient was DEC 15 2016, giving   her an HARJIT of SEP 24 2017 and a current gestational age of 14 weeks 1 day   by dates  A sonographic examination was performed on MAR 31 2017 using   real time equipment  The ultrasound examination was performed using   abdominal & vaginal techniques  The patient has a BMI of 33 4  Her blood   pressure today was 122/58  Earliest ultrasound found in her record: 2/15/17   9w1d  17 HARJIT      Cardiac motion was observed at 159 bpm       INDICATIONS      previous  delivery   obesity      Exam Types      Level I   Transvaginal      RESULTS      Fetus # 1 of 1   Vertex presentation   Fetal growth appeared normal   Placenta Location = Anterior   No placenta previa   Placenta Grade = I      MEASUREMENTS (* Included In Average GA)      AC               9 7 cm        15 weeks 3 days* (68%)   BPD              3 2 cm        16 weeks 0 days*   HC              12 4 cm        16 weeks 1 day *   Femur            1 9 cm        15 weeks 3 days*      HC/AC           1 28   FL/AC           0 19   FL/BPD          0 58   EFW (Ac/Fl/Hc)   131 grams - 0 lbs 5 oz      THE AVERAGE GESTATIONAL AGE is 15 weeks 5 days +/- 7 days  AMNIOTIC FLUID         Largest Vertical Pocket = 4 7 cm   Amniotic Fluid: Normal      CERVICAL EVALUATION      The cervix appeared normal (Ultrasound Examination)  SUPINE      Cervical Length: 3 40 cm      OTHER TEST RESULTS           Funneling?: No             Dynamic Changes?: No        Resp   To TFP?: No                      Debris?: No ANATOMY DETAILS      Visualized Appearing Sonographically Normal:   HEAD: (Calvarium, BPD Level, Choroid Plexus);    FACE/NECK: (Nuchal Fold,   Profile);    TH  CAV : (Diaphragm); HEART: (Four Chamber View, Proximal   Left Outflow, Short Axis of Greater Vessels);    STOMACH, BLADDER, ABD  WALL, SPINE: (Cervical Spine, Thoracic Spine, Lumbar Spine, Sacrum);      EXTREMS: (Lt Humerus, Rt Humerus, Lt Femur, Rt Femur, Lt Low Leg, Rt Low   Leg);    GENITALIA (Male), PLACENTA, UMBL  CORD, UTERUS      Not Visualized:   HEAD: (Cavum, Lateral Ventricles, Cerebellum, Cisterna Magna);      FACE/NECK: (Neck, Orbits, Nose/Lips, Palate);    RIGHT KIDNEY, LEFT   KIDNEY, EXTREMS: (Lt Forearm, Rt Forearm, Lt Hand, Rt Hand, Rt Foot); PCI      ADNEXA      The left ovary was not visualized  The right ovary appeared normal and   measured 2 8 x 3 6 x 2 0 cm with a volume of 10 5 cc  IMPRESSION      Hilton IUP   15 weeks and 5 days by this ultrasound  (HARJIT=SEP 17 2017)   Vertex presentation   Fetal growth appeared normal   Regular fetal heart rate of 159 bpm   Anterior placenta   No placenta previa      CONSULT COMMENT      Thank you very much for your kind referral of Rito Cheadle to the   [facility]  in [place] on 2017 for early second trimester   ultrasound evaluation and MFM consult  Lima Watters is a 30-year-old   -American female who is currently at 15-1/7 weeks gestation by an   estimated due date of 2017  She is referred for the   indication of a history of prior spontaneous  birth  Her prenatal   course so far has been unremarkable  With the exception of occasional   pelvic pressure, she has no complaints  She denies vaginal bleeding or   abdominal cramping  She has not yet been screened for gestational diabetes   during this pregnancy  Lima Watters has a history of 2 elective abortions   Her first successful   pregnancy resulted in spontaneous vaginal  birth following    premature rupture the membranes at 27 weeks gestation in   She   delivered a 2 lbs  9 oz  baby  Her next pregnancy was treated with weekly   17-OHPC and resulted in a vaginal delivery at 41 weeks gestation following   an uncomplicated prenatal course  Edel's only other pregnancy resulted   in  section of twins at 39 weeks gestation in 2013 following an   uncomplicated prenatal course  Her twins weighed 5 lbs  1 oz  and 4 lbs  15 oz  Each of her children is currently healthy  Paulo Benz is obese, with a current BMI of 33 4  She has a history of mild,   intermittent asthma, treated with a rescue inhaler as needed  Her past   surgical history is otherwise significant for a LEEP procedure  She   currently takes no medication with the exception of a prenatal vitamin on   a daily basis and has no known drug allergy  She smokes cigarettes but   denies alcohol or illicit drug use during the pregnancy  The family   genetic history is negative with respect to genetic abnormalities, birth   defects, or mental retardation  The family medical history is negative   with respect to first degree relatives with diabetes, hypertension, venous   thromboembolism, or thyroid disease  There is no family history of sickle   cell anemia  Paulo Benz does not believe that she has ever had screening for   sickle cell trait  No fetal structural abnormality or ultrasound marker for aneuploidy is   identified on the ultrasound study today  Multiple anatomic targets are   suboptimally imaged secondary to the constraints related to the early   gestational age and maternal obesity  Fetal growth and amniotic fluid   volume are normal   The placenta is normal in appearance  The cervix is normal in appearance by transvaginal sonography  The   cervical length is normal   Cervical debris is not present  Cervical   funneling is not present  Neither provocative nor dynamic change is   appreciated  Today's ultrasound findings and suggested follow-up were discussed in   detail with Edel  We discussed that her history of a prior spontaneous    birth is associated with an increased risk for recurrence of SPTB   during her current pregnancy  She will be followed in the [facility]    with transvaginal ultrasound evaluations for cervical surveillance, every   2 weeks, 324 weeks gestation  Cervical cerclage is recommended with   cervical shortening of less than 25 mm prior to 24 weeks gestation  I also   recommended treatment with weekly IM 17-OHPC, 250 mg, between 16 and 36   completed weeks gestation which will reduce her risk for recurrence of   spontaneous  birth by about 30%  We have begun the process of   securing the product for Edel  Level II ultrasound evaluation will be   performed at about 20 weeks gestation  Tobacco use during pregnancy is a   modifiable risk factor for  delivery  Smoking cessation is   recommended  Maternal obesity is associated with an increased risk for adverse   pregnancy outcomes, including gestational diabetes, fetal growth   abnormalities including macrosomia, fetal structural abnormalities,   preeclampsia, venous thromboembolism, stillbirth, and increased likelihood   for  section  Early screening for gestational diabetes should be   considered, with repeat evaluation between 24 and 28 weeks gestation, if   initially negative  Gustavo Avelar should be evaluated with a hemoglobin electrophoresis, if not   previously done, to determine her sickle cell trait carrier status  The face to face time, in addition to time spent discussing ultrasound   results, was approximately 15 minutes, greater than 50% of which was spent   during counseling and coordination of care  TIO Shirley M D     Maternal-Fetal Medicine   Electronically signed 17 12:23           Electronically signed by:Sam Gary HILARIO    Apr  3 2017  8:31AM EST

## 2018-01-16 NOTE — PROGRESS NOTES
MAY 19 2017         RE: Megan Hicks                                  To: [facility]  MR#: [id number]                                    [address]   : [address]  [address]   ENC: [id number]:MIL Melendez, [address]   (Exam #: [id number])                           Fax: [phone]      The LMP of this 36year old,  G6, P1-3-2-4 patient was DEC 15 2016, giving   her an HARJIT of SEP 24 2017 and a current gestational age of 25 weeks 1 day   by dates  A sonographic examination was performed on MAY 19 2017 using   real time equipment  The ultrasound examination was performed using   abdominal & vaginal techniques  The patient has a BMI of 34 6  Her blood   pressure today was 101/68  Earliest ultrasound found in her record: 2/15/17   9w1d  17 HARJIT         Problem list   Hx of a 27 week PTD after PPROM in   Receiving 17-P  Had delivered at   term with a prior pregnancy on 17-P in   Hx of a prior CS   Hx of Twins in  that delivered at 36 weeks by CS  Mac Avers has no complaints  She denies abdominal or pelvic pain, vaginal   bleeding, mucoid vaginal discharge, or suspected PPROM  Mac Avers continues   to receive weekly treatment with IM progesterone given her history of a   prior spontaneous  birth  Cardiac motion was observed at 154 bpm       INDICATIONS      previous  delivery      Exam Types      Transvaginal      RESULTS      Fetus # 1 of 1   Breech presentation   Placenta Location = Anterior   No placenta previa   Placenta Grade = I      AMNIOTIC FLUID         Largest Vertical Pocket = 7 4 cm   Amniotic Fluid: Normal      CERVICAL EVALUATION      The cervix appeared normal (Ultrasound Examination)  SUPINE      Cervical Length: 3 80 cm      OTHER TEST RESULTS           Funneling?: No             Dynamic Changes?: No        Resp   To TFP?: No                      Debris?: No IMPRESSION      Hilton IUP   Breech presentation   Regular fetal heart rate of 154 bpm   Anterior placenta   No placenta previa      GENERAL COMMENT      The cervix is normal in appearance by transvaginal sonography  The   cervical length is normal   Cervical debris is not present  Cervical   funneling is not present  Neither provocative nor dynamic change is   appreciated  Chelsy Costello was given an IM injection of 250 mg of 17-OHPC at her visit today  Today's ultrasound findings and suggested follow-up were discussed in   detail with Edel  She will return to the [address] [place] [address] in 10 days   for repeat cervical sonography  Cervical cerclage is recommended with   cervical shortening to 25 mm or less prior to 24 weeks gestation  Fetal   growth will be reassessed in the early third trimester  Continuation of   weekly 17-OHPC is recommended through 36 completed weeks gestation  The face to face time, in addition to time spent discussing ultrasound   results, was approximately 10 minutes, greater than 50% of which was spent   during counseling and coordination of care  TIO Gutierrez S , R YANELIS C S  ELIZABETH Liriano  Maternal-Fetal Medicine   Electronically signed 05/19/17 14:40           Electronically signed by:Christopher Andi Moritz M D    May 21 2017  7:34AM EST

## 2018-01-16 NOTE — MISCELLANEOUS
Message   Recorded as Task   Date: 2017 11:26 AM, Created By: Jona Randall   Task Name: Medical Complaint Callback   Assigned To: Alisson Lee   Regarding Patient: Brayton Osgood, Status: In Progress   Comment:    Deanna Loving - 21 Aug 2017 11:26 AM     TASK CREATED  Caller: Self; [phone] (Mobile Phone)  pt called in regards to contractions? ? pt is very uncomfortable pt is 34w pregnant pt states last week she went to the er and was 4cent  dilated please advise pt @ [phone]   [facility] - 21 Aug 2017 11:50 AM     TASK IN PROGRESS   [facility] - 21 Aug 2017 11:50 AM     TASK REPLIED TO: Previously Ass[facility]  Attempted to reach pt and phone number was "not in service"  [facility] - 21 Aug 2017 12:54 PM     TASK REPLIED TO: Previously Assigned To [facility]  Attempted to speak with pt  Connection was very poor and it was difficult to hear  Pt reports not having a working phone and states it will be impossible to call her back  From what was understood pt reports abdominal pains, irregular ctx, and felt certain she was more dilated given discharge she was having  Pt wanted to be seen in triage  [address] made aware  Active Problems    1  Asthma (493 90) (J45 909)   2  History of  delivery, currently pregnant (654 20) (O34 219)   3  History of  delivery, currently pregnant in third trimester (V23 41) (O09 213)   4  Maternal obesity, antepartum, third trimester (649 13,278 00) (W47 318)   5  Pregnancy complicated by noncompliance, antepartum (V23 89,V15 81)   (O09 899,Z91 19)   6  Supervision of high risk pregnancy in second trimester (V23 9) (O09 92)   7  Tobacco use affecting pregnancy, antepartum (649 03) (O99 330)    Current Meds   1  Slaton 250 MG/ML Intramuscular Oil (Hydroxyprogesterone Caproate); INJECT 250    MG Intramuscular 250mg/ml  weekly until 36 weeks gestation; Therapy: 2017 to Recorded   2   PNV OB+DHA 27-1 & 250 MG Oral Miscellaneous; Take 1 tablet daily; Therapy: 29CBQ9651 to (Evaluate:18Mar2018)  Requested for: 52PAS2805; Last   Rx:23Mar2017 Ordered    Allergies    1  No Known Drug Allergies    2  No Known Environmental Allergies   3   No Known Food Allergies    Signatures   Electronically signed by : Heidy Serrano, ; Aug 21 2017 12:55PM EST                       (Author)

## 2018-01-17 NOTE — MISCELLANEOUS
Message   Recorded as Task   Date: 2017 01:11 PM, Created By: Stanley April   Task Name: Follow Up   Assigned To: Jose Brown   Regarding Patient: Agustin Javier, Status: In Progress   Comment:    Stanley April - 2017 1:11 PM     TASK CREATED  Certified letter sent  June Hagan - 2017 1:44 PM     TASK IN PROGRESS        Active Problems    1  Asthma (493 90) (J45 909)   2  Encounter for fetal anatomic survey (V28 81) (Z36)   3  History of  delivery, currently pregnant (654 20) (O34 219)   4  History of  delivery, currently pregnant in second trimester (V23 41) (O09 212)   5  History of  delivery, currently pregnant in third trimester (V23 41) (O09 213)   6  Maternal obesity, antepartum, second trimester (649 13,278 00) (T81 583)   7  Maternal obesity, antepartum, third trimester (649 13,278 00) (A94 909)   8  Pregnancy complicated by noncompliance, antepartum (V23 89,V15 81)   (O09 899,Z91 19)   9  Supervision of high risk pregnancy in second trimester (V23 9) (O09 92)   10  Tobacco use affecting pregnancy, antepartum (649 03) (O99 330)    Current Meds   1  [place] 250 MG/ML Intramuscular Oil (Hydroxyprogesterone Caproate); INJECT 250    MG Intramuscular 250mg/ml  weekly until 36 weeks gestation; Therapy: 2017 to Recorded   2  PNV OB+DHA 27-1 & 250 MG Oral Miscellaneous; Take 1 tablet daily; Therapy: 63TWU3018 to (Evaluate:2018)  Requested for: 35WJU1066; Last   Rx:2017 Ordered    Allergies    1  No Known Drug Allergies    2  No Known Environmental Allergies   3   No Known Food Allergies    Signatures   Electronically signed by : Darylene Cure, ; 2017  1:44PM EST                       (Author)

## 2018-01-18 NOTE — PROGRESS NOTES
MAY 30 2017         RE: Sophia Guardado                                  To: [facility]  MR#: [id number]                                    [address]   : [address]   ENC: [id number]:DARVIN Melendez, [address]   (Exam #: [id number])                           Fax: [phone]      The LMP of this 36year old,  G6, P1-3-2-4 patient was DEC 15 2016, giving   her an HARJIT of SEP 24 2017 and a current gestational age of 20 weeks 5 days   by dates  A sonographic examination was performed on MAY 30 2017 using   real time equipment  The ultrasound examination was performed using   abdominal technique  The patient has a BMI of 34 4  Her blood pressure   today was 123/64  Earliest ultrasound found in her record: 2/15/17   9w1d  17 HARJIT            Samir Kerr has no complaints  She denies abdominal or pelvic pain, vaginal   bleeding, mucoid vaginal discharge, or suspected PPROM  Samir Kerr reports   fetal movement  She continues to be treated with weekly IM progesterone   given her history of a prior spontaneous  birth  Cardiac motion was observed at 159 bpm       INDICATIONS      previous  delivery   Evaluate missed anatomy      Exam Types      Transvaginal      RESULTS      Fetus # 1 of 1   Transverse presentation   Placenta Location = Anterior   No placenta previa   Placenta Grade = I      AMNIOTIC FLUID         Largest Vertical Pocket = 5 7 cm   Amniotic Fluid: Normal      CERVICAL EVALUATION      The cervix appeared normal (Ultrasound Examination)  SUPINE      Cervical Length: 3 00 cm      OTHER TEST RESULTS           Funneling?: No             Dynamic Changes?: No        Resp   To TFP?: No                      Debris?: No      ANATOMY DETAILS      Visualized Appearing Sonographically Normal:   FACE/NECK: (Nose/Lips);    EXTREMS: (Rt Humerus)      Suboptimally Visualized:   FACE/NECK: (Palate); HEART: (IVC, SVC); EXTREMS: (Rt Forearm, Rt   Hand)      Not Visualized:   FACE/NECK: (Profile); HEART: (Short [place] of Greater Vessels, Ductal   Arch)      IMPRESSION      Hilton IUP   Transverse presentation   Regular fetal heart rate of 159 bpm   Anterior placenta   No placenta previa      GENERAL COMMENT      Detailed evaluation of fetal growth and anatomy was not performed at the   visit today  Evaluation of anatomic targets not imaged well at the level   II ultrasound study was attempted  The coronal view of the nose and lips   and right humerus appear normal  Suboptimal imaging of the neck, facial   profile, palate, distal right upper extremity, ductal arch, IVC and SVC,   and short axis view the great vessels remains present secondary to the   constraints related to unfavorable fetal position and maternal body   habitus  The cervix is normal in appearance by transvaginal sonography  The   cervical length is normal   Cervical debris is not present  Cervical   funneling is not present  Neither provocative nor dynamic change is   appreciated  Today's ultrasound findings and suggested follow-up were discussed in   detail with Drewrashaunkaty  She will return to the [facility]  on June 30 to   assess interval growth and evaluate anatomic targets not imaged well   today  Repeat cervical sonography is recommended as clinically indicated  Continuation of weekly 17-OHPC is recommended through 36 completed weeks   gestation  The face to face time, in addition to time spent discussing ultrasound   results, was approximately 10 minutes, greater than 50% of which was spent   during counseling and coordination of care  Megan Hem, R D M S Percell Closs, M D     Maternal-Fetal Medicine   Electronically signed 05/30/17 17:32

## 2018-01-22 VITALS
BODY MASS INDEX: 34.99 KG/M2 | DIASTOLIC BLOOD PRESSURE: 82 MMHG | WEIGHT: 210.01 LBS | SYSTOLIC BLOOD PRESSURE: 136 MMHG | HEIGHT: 65 IN

## 2018-01-22 VITALS
HEIGHT: 65 IN | DIASTOLIC BLOOD PRESSURE: 62 MMHG | WEIGHT: 207 LBS | BODY MASS INDEX: 34.49 KG/M2 | SYSTOLIC BLOOD PRESSURE: 110 MMHG

## 2018-01-22 VITALS
SYSTOLIC BLOOD PRESSURE: 115 MMHG | HEIGHT: 65 IN | WEIGHT: 213 LBS | BODY MASS INDEX: 35.49 KG/M2 | DIASTOLIC BLOOD PRESSURE: 72 MMHG

## 2018-01-22 VITALS
SYSTOLIC BLOOD PRESSURE: 110 MMHG | HEIGHT: 65 IN | BODY MASS INDEX: 34.32 KG/M2 | DIASTOLIC BLOOD PRESSURE: 70 MMHG | WEIGHT: 206 LBS

## 2018-01-22 VITALS
HEIGHT: 65 IN | WEIGHT: 209 LBS | BODY MASS INDEX: 34.82 KG/M2 | DIASTOLIC BLOOD PRESSURE: 76 MMHG | SYSTOLIC BLOOD PRESSURE: 114 MMHG

## 2018-01-22 VITALS
HEIGHT: 65 IN | BODY MASS INDEX: 33.55 KG/M2 | WEIGHT: 201.4 LBS | DIASTOLIC BLOOD PRESSURE: 67 MMHG | SYSTOLIC BLOOD PRESSURE: 102 MMHG

## 2018-01-22 VITALS
DIASTOLIC BLOOD PRESSURE: 64 MMHG | WEIGHT: 202.4 LBS | SYSTOLIC BLOOD PRESSURE: 108 MMHG | HEIGHT: 65 IN | BODY MASS INDEX: 33.72 KG/M2

## 2018-01-22 VITALS
WEIGHT: 204 LBS | BODY MASS INDEX: 33.99 KG/M2 | DIASTOLIC BLOOD PRESSURE: 72 MMHG | SYSTOLIC BLOOD PRESSURE: 116 MMHG | HEIGHT: 65 IN

## 2018-01-22 VITALS
DIASTOLIC BLOOD PRESSURE: 58 MMHG | HEIGHT: 65 IN | WEIGHT: 206 LBS | BODY MASS INDEX: 34.32 KG/M2 | SYSTOLIC BLOOD PRESSURE: 104 MMHG

## 2018-01-22 VITALS
SYSTOLIC BLOOD PRESSURE: 114 MMHG | BODY MASS INDEX: 35.49 KG/M2 | WEIGHT: 213 LBS | DIASTOLIC BLOOD PRESSURE: 70 MMHG | HEIGHT: 65 IN

## 2018-01-22 VITALS
WEIGHT: 208 LBS | SYSTOLIC BLOOD PRESSURE: 120 MMHG | DIASTOLIC BLOOD PRESSURE: 78 MMHG | BODY MASS INDEX: 34.66 KG/M2 | HEIGHT: 65 IN

## 2018-01-22 VITALS
DIASTOLIC BLOOD PRESSURE: 74 MMHG | BODY MASS INDEX: 34.99 KG/M2 | WEIGHT: 210 LBS | SYSTOLIC BLOOD PRESSURE: 120 MMHG | HEIGHT: 65 IN

## 2018-01-22 VITALS — BODY MASS INDEX: 34.78 KG/M2 | WEIGHT: 209 LBS | DIASTOLIC BLOOD PRESSURE: 52 MMHG | SYSTOLIC BLOOD PRESSURE: 98 MMHG

## 2018-01-22 VITALS — BODY MASS INDEX: 35.11 KG/M2 | DIASTOLIC BLOOD PRESSURE: 62 MMHG | WEIGHT: 211 LBS | SYSTOLIC BLOOD PRESSURE: 104 MMHG

## 2018-03-05 ENCOUNTER — OFFICE VISIT (OUTPATIENT)
Dept: OBGYN CLINIC | Facility: MEDICAL CENTER | Age: 33
End: 2018-03-05
Payer: COMMERCIAL

## 2018-03-05 VITALS
HEIGHT: 65 IN | BODY MASS INDEX: 35.49 KG/M2 | WEIGHT: 213 LBS | DIASTOLIC BLOOD PRESSURE: 80 MMHG | SYSTOLIC BLOOD PRESSURE: 122 MMHG

## 2018-03-05 DIAGNOSIS — N83.201 CYST OF RIGHT OVARY: Primary | ICD-10-CM

## 2018-03-05 PROCEDURE — 99213 OFFICE O/P EST LOW 20 MIN: CPT | Performed by: PHYSICIAN ASSISTANT

## 2018-03-05 RX ORDER — NYSTATIN 10B UNIT
POWDER (EA) MISCELLANEOUS
COMMUNITY
Start: 2018-02-26

## 2018-03-05 RX ORDER — ETONOGESTREL/ETHINYL ESTRADIOL .12-.015MG
RING, VAGINAL VAGINAL
Refills: 11 | COMMUNITY
Start: 2017-12-30 | End: 2018-09-07 | Stop reason: ALTCHOICE

## 2018-03-05 NOTE — PROGRESS NOTES
August Buciosaad  1985    S:  29year old female here for evaluation  She is seeing a surgeon to evaluate and treat an umbilical hernia  She was found to have a right ovarian abnormality on CT scan and she was asked to see us for evaluation of this prior to her surgery  We reviewed her CT scan showing a right ovarian mass, likely cyst, 4 5cm  There is mention of a comparison to an ultrasound 10 years ago and this appears stable  She says she had an ovarian cyst over 10 years ago when she was seeing a midwife and had numerous ultrasounds for this  She denies pain with intercourse, pelvic pain  She uses NuvaRing for contraception and has very short, light periods  She has never had painful periods  O:  External genitals are normal  Vagina is normal  Cervix is normal, nontender  Uterus is normal, nontender  Adnexa are not palpable due to body habitus, no tenderness  A/P:  Ovarian cyst on CT  Will check pelvic US  If stable for 10 years, will observe and OK to proceed with herniorrhaphy

## 2018-03-07 NOTE — PROGRESS NOTES
[employer] 2nd Trimester:   Second Trimester Education provided:   benefits of breastfeeding, importance of exclusive breastfeeding, early initiation of breastfeeding, exclusive breastfeeding for the first 6 months, non-pharmacologic pain relief methods for labor and rooming-in on 24 hour basis  Mother has not registered for prenatal class  Thought has been given to selecting a pediatrician  Prenatal education provided by: Sarita Ramires MA      Signatures   Electronically signed by :  Olga Peoples, ; Mar 21 2017 10:04AM EST                       (Co-author)    Electronically signed by : Shadi Solorio MD; Mar 21 2017  2:53PM EST

## 2018-08-22 ENCOUNTER — OFFICE VISIT (OUTPATIENT)
Dept: OBGYN CLINIC | Facility: CLINIC | Age: 33
End: 2018-08-22
Payer: COMMERCIAL

## 2018-08-22 VITALS
SYSTOLIC BLOOD PRESSURE: 110 MMHG | HEIGHT: 65 IN | BODY MASS INDEX: 35.85 KG/M2 | WEIGHT: 215.2 LBS | DIASTOLIC BLOOD PRESSURE: 70 MMHG | RESPIRATION RATE: 14 BRPM

## 2018-08-22 DIAGNOSIS — O02.1 EMBRYONIC DEMISE: Primary | ICD-10-CM

## 2018-08-22 PROBLEM — Z3A.40 40 WEEKS GESTATION OF PREGNANCY: Status: RESOLVED | Noted: 2017-09-22 | Resolved: 2018-08-22

## 2018-08-22 PROBLEM — Z98.891 HISTORY OF CESAREAN SECTION: Status: RESOLVED | Noted: 2017-09-22 | Resolved: 2018-08-22

## 2018-08-22 PROBLEM — O30.049 TWIN GESTATION, DICHORIONIC DIAMNIOTIC: Status: RESOLVED | Noted: 2017-09-22 | Resolved: 2018-08-22

## 2018-08-22 PROBLEM — O34.219 VBAC (VAGINAL BIRTH AFTER CESAREAN): Status: RESOLVED | Noted: 2017-09-22 | Resolved: 2018-08-22

## 2018-08-22 PROBLEM — Z98.891 HX OF CESAREAN SECTION: Status: RESOLVED | Noted: 2017-08-16 | Resolved: 2018-08-22

## 2018-08-22 PROBLEM — O09.90 HIGH-RISK PREGNANCY: Status: RESOLVED | Noted: 2017-09-22 | Resolved: 2018-08-22

## 2018-08-22 PROBLEM — O09.899 HISTORY OF PRETERM DELIVERY, CURRENTLY PREGNANT: Status: RESOLVED | Noted: 2017-08-14 | Resolved: 2018-08-22

## 2018-08-22 PROCEDURE — 99215 OFFICE O/P EST HI 40 MIN: CPT | Performed by: OBSTETRICS & GYNECOLOGY

## 2018-08-22 PROCEDURE — 76817 TRANSVAGINAL US OBSTETRIC: CPT | Performed by: OBSTETRICS & GYNECOLOGY

## 2018-08-22 NOTE — PROGRESS NOTES
Assessment/Plan:      Diagnoses and all orders for this visit:    Embryonic demise  -     hCG, quantitative        Findings of embryonic demise were discussed with the patient  As she has not passed the pregnancy and we discussed several options  Expected management was reviewed  Patient also had been given the option of observation over this next week and then use of misoprostol if she has not expelled the pregnancy sac  Third option of surgical intervention was reviewed and patient declines  We have agreed upon having patient return to the office in 9 days for repeat evaluation with sonography and probable placement of misoprostol if expulsion has not occurred  Patient was instructed to call if she has significant bleeding or pain in the interim  This visit was for 45 minutes with greater than 50% of that afforded to counseling and coordination of care  Subjective:     Patient ID: Lanis Bamberger is a 34 y o  female  Patient presents as a follow-up from the emergency room at [facility]   She is known to have a early pregnancy thought to be a probable demise  She had initiation of vaginal bleeding 1 week ago and presented for emergent visit  She was found to have a quantitative HCG of 22,000  Pelvic ultrasound demonstrated a yolk sac and a gestational sac without evidence of an obvious fetal pole  Patient continues with occasional spotting she has had no further issues since  She is known to be blood type A positive        Review of Systems   All other systems reviewed and are negative  Objective:     Physical Exam   Constitutional: She is oriented to person, place, and time  She appears well-developed and well-nourished  Pulmonary/Chest: Effort normal    Abdominal: Soft  There is no tenderness  Genitourinary: Vagina normal and uterus normal    Neurological: She is alert and oriented to person, place, and time  Skin: Skin is warm and dry     Psychiatric: She has a normal mood and affect  Her behavior is normal  Judgment and thought content normal        Early OB Ultrasound Procedure Note  Megan Hicks  YOB: 1985      Referring Physician: No ref  provider found  Technician: Study performed by the interpreting physician    Indications:  amenorrhea   /70 (BP Location: Left arm, Patient Position: Sitting, Cuff Size: Standard)   Resp 14   Ht 5' 5" (1 651 m)   Wt 97 6 kg (215 lb 3 2 oz)   BMI 35 81 kg/m²     No LMP recorded  , , with EGA of  unknown weeks     Patient admits to much improved vaginal bleeding or brown discharge      Procedure Details  A gestational sac is seen containing a yolk sac embryo is not seen  Amorphous tissue is noted  Description of ovaries: L CL cyst 2 19cm  Description of uterus: normal  No free fluid    Findings:  Embryonic demise

## 2018-09-07 ENCOUNTER — OFFICE VISIT (OUTPATIENT)
Dept: OBGYN CLINIC | Facility: MEDICAL CENTER | Age: 33
End: 2018-09-07
Payer: COMMERCIAL

## 2018-09-07 VITALS — DIASTOLIC BLOOD PRESSURE: 70 MMHG | BODY MASS INDEX: 35.45 KG/M2 | SYSTOLIC BLOOD PRESSURE: 120 MMHG | WEIGHT: 213 LBS

## 2018-09-07 DIAGNOSIS — O02.1 EMBRYONIC DEMISE: Primary | ICD-10-CM

## 2018-09-07 PROCEDURE — 99214 OFFICE O/P EST MOD 30 MIN: CPT | Performed by: OBSTETRICS & GYNECOLOGY

## 2018-09-07 PROCEDURE — 76817 TRANSVAGINAL US OBSTETRIC: CPT | Performed by: OBSTETRICS & GYNECOLOGY

## 2018-09-07 RX ORDER — MEDROXYPROGESTERONE ACETATE 150 MG/ML
150 INJECTION, SUSPENSION INTRAMUSCULAR
Qty: 1 ML | Refills: 1 | Status: SHIPPED | OUTPATIENT
Start: 2018-09-07 | End: 2019-06-18 | Stop reason: SDUPTHER

## 2018-09-07 NOTE — PROGRESS NOTES
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  Patient was counseled on options and has chosen use of misoprostol to expedite expulsion  800 mcg was placed vaginally  We did review the typical chronology of events  She was given 1 200 mcg tablet to be taken if she feels her bleeding is excessive  Patient was advised to call me with any concerns during the evening as I am on call  She is blood type Rh positive therefore RhoGAM is not indicated  Patient is interested in Depo-Provera and this will be prescribed and will be injected at her follow-up visit in 2 weeks  This visit was for 30 minutes with greater than 50% of that afforded counseling and coordination of care  Subjective:     Patient ID: Bogdan Osullivan is a 34 y o  female  Patient returns for re-evaluation of embryonic demise  Patient has begun to have increased cramping and spotting just beginning in this last day  She denies fever chills  She is doing well emotionally  Review of Systems   All other systems reviewed and are negative  Objective:     Physical Exam   Constitutional: She appears well-developed and well-nourished  Abdominal: Soft  There is no tenderness  Genitourinary:   Genitourinary Comments: External genitalia normal female  Vagina healthy without lesions or discharge; no bleeding noted  Cervix soft, closed no masses or bleeding  Uterus 6 week size nontender  Adnexa nontender without mass       Early OB Ultrasound Procedure Note  Bogdan Osullivan  YOB: 1985      Referring Physician: No ref  provider found  Technician: Study performed by the interpreting physician    Indications:  amenorrhea   /70 (BP Location: Left arm, Patient Position: Sitting)   Wt 96 6 kg (213 lb)   BMI 35 45 kg/m²       Patient has vaginal bleeding or brown discharge      Procedure Details  A gestational sac is seen     Subchorionic lucency is absent    Recognized fetal tissue was not present  There is a amorphous collection measuring 1 84 cm    Gestational sac appears to be collapsing  Description of ovaries: normal  Description of uterus: normal    Findings:  Embryonic demise

## 2018-09-11 ENCOUNTER — HOSPITAL ENCOUNTER (EMERGENCY)
Facility: HOSPITAL | Age: 33
Discharge: HOME | End: 2018-09-12
Attending: EMERGENCY MEDICINE | Admitting: EMERGENCY MEDICINE
Payer: COMMERCIAL

## 2018-09-11 DIAGNOSIS — D64.9 ANEMIA: Primary | ICD-10-CM

## 2018-09-11 LAB
ABO GROUP BLD: NORMAL
ALBUMIN SERPL BCP-MCNC: 3.3 G/DL (ref 3.5–5)
ALP SERPL-CCNC: 45 U/L (ref 46–116)
ALT SERPL W P-5'-P-CCNC: 18 U/L (ref 12–78)
ANION GAP SERPL CALCULATED.3IONS-SCNC: 5 MMOL/L (ref 4–13)
APTT PPP: 26 SECONDS (ref 24–36)
AST SERPL W P-5'-P-CCNC: 13 U/L (ref 5–45)
ATRIAL RATE: 97 BPM
BASOPHILS # BLD AUTO: 0.03 THOUSANDS/ΜL (ref 0–0.1)
BASOPHILS NFR BLD AUTO: 0 % (ref 0–1)
BILIRUB SERPL-MCNC: 0.1 MG/DL (ref 0.2–1)
BLD GP AB SCN SERPL QL: NEGATIVE
BUN SERPL-MCNC: 6 MG/DL (ref 5–25)
CALCIUM SERPL-MCNC: 8.5 MG/DL (ref 8.3–10.1)
CHLORIDE SERPL-SCNC: 105 MMOL/L (ref 100–108)
CO2 SERPL-SCNC: 30 MMOL/L (ref 21–32)
CREAT SERPL-MCNC: 0.71 MG/DL (ref 0.6–1.3)
EOSINOPHIL # BLD AUTO: 0.24 THOUSAND/ΜL (ref 0–0.61)
EOSINOPHIL NFR BLD AUTO: 2 % (ref 0–6)
ERYTHROCYTE [DISTWIDTH] IN BLOOD BY AUTOMATED COUNT: 14.3 % (ref 11.6–15.1)
GFR SERPL CREATININE-BSD FRML MDRD: 131 ML/MIN/1.73SQ M
GLUCOSE SERPL-MCNC: 84 MG/DL (ref 65–140)
HCT VFR BLD AUTO: 21 % (ref 34.8–46.1)
HGB BLD-MCNC: 6.6 G/DL (ref 11.5–15.4)
IMM GRANULOCYTES # BLD AUTO: 0.27 THOUSAND/UL (ref 0–0.2)
IMM GRANULOCYTES NFR BLD AUTO: 2 % (ref 0–2)
INR PPP: 1 (ref 0.86–1.17)
LYMPHOCYTES # BLD AUTO: 4.49 THOUSANDS/ΜL (ref 0.6–4.47)
LYMPHOCYTES NFR BLD AUTO: 33 % (ref 14–44)
MCH RBC QN AUTO: 27.8 PG (ref 26.8–34.3)
MCHC RBC AUTO-ENTMCNC: 31.4 G/DL (ref 31.4–37.4)
MCV RBC AUTO: 89 FL (ref 82–98)
MONOCYTES # BLD AUTO: 0.91 THOUSAND/ΜL (ref 0.17–1.22)
MONOCYTES NFR BLD AUTO: 7 % (ref 4–12)
NEUTROPHILS # BLD AUTO: 7.63 THOUSANDS/ΜL (ref 1.85–7.62)
NEUTS SEG NFR BLD AUTO: 56 % (ref 43–75)
NRBC BLD AUTO-RTO: 0 /100 WBCS
P AXIS: 44 DEGREES
PLATELET # BLD AUTO: 284 THOUSANDS/UL (ref 149–390)
PMV BLD AUTO: 9.3 FL (ref 8.9–12.7)
POTASSIUM SERPL-SCNC: 3.7 MMOL/L (ref 3.5–5.3)
PR INTERVAL: 168 MS
PROT SERPL-MCNC: 6.6 G/DL (ref 6.4–8.2)
PROTHROMBIN TIME: 13.1 SECONDS (ref 11.8–14.2)
QRS AXIS: 65 DEGREES
QRSD INTERVAL: 88 MS
QT INTERVAL: 352 MS
QTC INTERVAL: 447 MS
RBC # BLD AUTO: 2.37 MILLION/UL (ref 3.81–5.12)
RH BLD: POSITIVE
SODIUM SERPL-SCNC: 140 MMOL/L (ref 136–145)
SPECIMEN EXPIRATION DATE: NORMAL
T WAVE AXIS: 53 DEGREES
TROPONIN I SERPL-MCNC: <0.02 NG/ML
VENTRICULAR RATE: 97 BPM
WBC # BLD AUTO: 13.57 THOUSAND/UL (ref 4.31–10.16)

## 2018-09-11 PROCEDURE — 86900 BLOOD TYPING SEROLOGIC ABO: CPT | Performed by: EMERGENCY MEDICINE

## 2018-09-11 PROCEDURE — 36415 COLL VENOUS BLD VENIPUNCTURE: CPT | Performed by: EMERGENCY MEDICINE

## 2018-09-11 PROCEDURE — 80053 COMPREHEN METABOLIC PANEL: CPT | Performed by: EMERGENCY MEDICINE

## 2018-09-11 PROCEDURE — 85025 COMPLETE CBC W/AUTO DIFF WBC: CPT | Performed by: EMERGENCY MEDICINE

## 2018-09-11 PROCEDURE — 86901 BLOOD TYPING SEROLOGIC RH(D): CPT | Performed by: EMERGENCY MEDICINE

## 2018-09-11 PROCEDURE — 85610 PROTHROMBIN TIME: CPT | Performed by: EMERGENCY MEDICINE

## 2018-09-11 PROCEDURE — 93005 ELECTROCARDIOGRAM TRACING: CPT

## 2018-09-11 PROCEDURE — 85730 THROMBOPLASTIN TIME PARTIAL: CPT | Performed by: EMERGENCY MEDICINE

## 2018-09-11 PROCEDURE — 84484 ASSAY OF TROPONIN QUANT: CPT | Performed by: EMERGENCY MEDICINE

## 2018-09-11 PROCEDURE — 96360 HYDRATION IV INFUSION INIT: CPT

## 2018-09-11 PROCEDURE — P9016 RBC LEUKOCYTES REDUCED: HCPCS

## 2018-09-11 PROCEDURE — 86920 COMPATIBILITY TEST SPIN: CPT

## 2018-09-11 PROCEDURE — 86850 RBC ANTIBODY SCREEN: CPT | Performed by: EMERGENCY MEDICINE

## 2018-09-11 PROCEDURE — 93010 ELECTROCARDIOGRAM REPORT: CPT | Performed by: INTERNAL MEDICINE

## 2018-09-11 RX ADMIN — SODIUM CHLORIDE 1000 ML: 0.9 INJECTION, SOLUTION INTRAVENOUS at 19:36

## 2018-09-11 NOTE — ED PROVIDER NOTES
History  Chief Complaint   Patient presents with    Abnormal Lab     Pt states she had blood work drawn yesterday after a procedure on the weekend  Hgb was low  Pt was told to come to ED  Pt also dizzy     Patient is a 74-year-old female  She does have a history of iron deficiency anemia  She recently underwent a miscarriage and experienced a lot of bleeding  The bleeding has let up  However she has been feeling dizzy  A CBC was done and apparently her hemoglobin is 6  She was sent to the emergency room  She is on iron  There is no hematochezia or hematemesis  Stools did turn black, but not until after she started the iron  No chest pain or shortness of breath  Symptoms are moderately severe  Worse when she stands up or walks  Prior to Admission Medications   Prescriptions Last Dose Informant Patient Reported? Taking?    Nystatin POWD  Self Yes No   Sig: Apply to abdominal crease   acetaminophen (TYLENOL) 325 mg tablet  Self No No   Si Tab Q4 PRN   Patient not taking: Reported on 2018    albuterol (PROVENTIL HFA,VENTOLIN HFA) 90 mcg/act inhaler  Self Yes No   Sig: Inhale 2 puffs   benzocaine-menthol-lanolin-aloe (DERMOPLAST) 20-0 5 % topical spray  Self No No   Sig: Apply 1 application topically 4 (four) times a day as needed for mild pain   Patient not taking: Reported on 2018    docusate sodium (COLACE) 100 mg capsule  Self No No   Sig: Take 1 capsule by mouth 2 (two) times a day   hydrocortisone 1 % cream  Self No No   Sig: Apply 1 application topically as needed for irritation   Patient not taking: Reported on 2018    ibuprofen (MOTRIN) 600 mg tablet  Self No No   Sig: Take 1 tablet by mouth every 4 (four) hours as needed for mild pain   medroxyPROGESTERone (DEPO-PROVERA) 150 mg/mL injection   No No   Sig: Inject 1 mL (150 mg total) into a muscle every 3 (three) months      Facility-Administered Medications: None       Past Medical History:   Diagnosis Date    Abnormal Pap smear of cervix     Asthma     Last Assessed: 2017    Obese     Varicella     childhood       Past Surgical History:   Procedure Laterality Date    CERVICAL BIOPSY  W/ LOOP ELECTRODE EXCISION      CERVICAL BIOPSY  W/ LOOP ELECTRODE EXCISION      Last Assessed: 2017     SECTION      2010 son, 2013 twin sons; low transverse    DILATION AND EVACUATION      Surgically induced  - ,        Family History   Problem Relation Age of Onset    Heart disease Father         Cardiac Disorder    Asthma Brother     Cancer Maternal Grandmother     Diabetes Maternal Grandfather      I have reviewed and agree with the history as documented  Social History   Substance Use Topics    Smoking status: Former Smoker    Smokeless tobacco: Never Used    Alcohol use No        Review of Systems   Constitutional: Negative for chills and fever  HENT: Negative for rhinorrhea and sore throat  Eyes: Negative for pain, redness and visual disturbance  Respiratory: Negative for cough and shortness of breath  Cardiovascular: Negative for chest pain and leg swelling  Gastrointestinal: Negative for abdominal pain, diarrhea and vomiting  Endocrine: Negative for polydipsia and polyuria  Genitourinary: Positive for vaginal bleeding  Negative for dysuria, frequency, hematuria and vaginal discharge  Musculoskeletal: Negative for back pain and neck pain  Skin: Negative for rash and wound  Allergic/Immunologic: Negative for immunocompromised state  Neurological: Positive for dizziness  Negative for weakness, numbness and headaches  Hematological: Does not bruise/bleed easily  Psychiatric/Behavioral: Negative for hallucinations and suicidal ideas  All other systems reviewed and are negative  Physical Exam  Physical Exam   Constitutional: She is oriented to person, place, and time  She appears well-developed and well-nourished  No distress     HENT:   Head: Normocephalic and atraumatic  Mouth/Throat: Oropharynx is clear and moist    Eyes: EOM are normal  Pupils are equal, round, and reactive to light  Right eye exhibits no discharge  Left eye exhibits no discharge  No scleral icterus  Pale conjunctiva  Neck: Normal range of motion  Neck supple  Cardiovascular: Regular rhythm, normal heart sounds and intact distal pulses  Exam reveals no gallop and no friction rub  No murmur heard  Tachycardic   Pulmonary/Chest: Effort normal and breath sounds normal  No stridor  No respiratory distress  She has no wheezes  She has no rales  Abdominal: Soft  Bowel sounds are normal  She exhibits no distension  There is no tenderness  There is no rebound and no guarding  Musculoskeletal: Normal range of motion  She exhibits no edema, tenderness or deformity  No CVA tenderness  No calf tenderness/palpable cords  Neurological: She is alert and oriented to person, place, and time  She has normal strength  No sensory deficit  GCS eye subscore is 4  GCS verbal subscore is 5  GCS motor subscore is 6  Skin: Skin is warm and dry  No rash noted  There is pallor  Psychiatric: She has a normal mood and affect  Her behavior is normal    Vitals reviewed        Vital Signs  ED Triage Vitals [09/11/18 1714]   Temperature Pulse Respirations Blood Pressure SpO2   99 3 °F (37 4 °C) 105 20 128/64 100 %      Temp Source Heart Rate Source Patient Position - Orthostatic VS BP Location FiO2 (%)   Oral Monitor Sitting Left arm --      Pain Score       No Pain           Vitals:    09/11/18 1714 09/11/18 1842 09/11/18 2032 09/11/18 2052   BP: 128/64 (!) 89/49 114/56 102/52   Pulse: 105 104 97 95   Patient Position - Orthostatic VS: Sitting Lying         Visual Acuity      ED Medications  Medications   sodium chloride 0 9 % bolus 1,000 mL (0 mL Intravenous Stopped 9/11/18 2038)       Diagnostic Studies  Results Reviewed     Procedure Component Value Units Date/Time    Troponin I [[id number]] (Normal) Collected:  09/11/18 1909    Lab Status:  Final result Specimen:  Blood from Arm, Right Updated:  09/11/18 1934     Troponin I <0 02 ng/mL     Comprehensive metabolic panel [[id number]]  (Abnormal) Collected:  09/11/18 1909    Lab Status:  Final result Specimen:  Blood from Arm, Right Updated:  09/11/18 1932     Sodium 140 mmol/L      Potassium 3 7 mmol/L      Chloride 105 mmol/L      CO2 30 mmol/L      ANION GAP 5 mmol/L      BUN 6 mg/dL      Creatinine 0 71 mg/dL      Glucose 84 mg/dL      Calcium 8 5 mg/dL      AST 13 U/L      ALT 18 U/L      Alkaline Phosphatase 45 (L) U/L      Total Protein 6 6 g/dL      Albumin 3 3 (L) g/dL      Total Bilirubin 0 10 (L) mg/dL      eGFR 131 ml/min/1 73sq m     Narrative:         National Kidney Disease Education Program recommendations are as follows:  GFR calculation is accurate only with a steady state creatinine  Chronic Kidney disease less than 60 ml/min/1 73 sq  meters  Kidney failure less than 15 ml/min/1 73 sq  meters      Protime-INR [[id number]]  (Normal) Collected:  09/11/18 1909    Lab Status:  Final result Specimen:  Blood from Arm, Right Updated:  09/11/18 1928     Protime 13 1 seconds      INR 1 00    APTT [[id number]]  (Normal) Collected:  09/11/18 1909    Lab Status:  Final result Specimen:  Blood from Arm, Right Updated:  09/11/18 1928     PTT 26 seconds     CBC and differential [[id number]]  (Abnormal) Collected:  09/11/18 1909    Lab Status:  Final result Specimen:  Blood from Arm, Right Updated:  09/11/18 1926     WBC 13 57 (H) Thousand/uL      RBC 2 37 (L) Million/uL      Hemoglobin 6 6 (LL) g/dL      Hematocrit 21 0 (L) %      MCV 89 fL      MCH 27 8 pg      MCHC 31 4 g/dL      RDW 14 3 %      MPV 9 3 fL      Platelets 887 Thousands/uL      nRBC 0 /100 WBCs      Neutrophils Relative 56 %      Immat GRANS % 2 %      Lymphocytes Relative 33 %      Monocytes Relative 7 %      Eosinophils Relative 2 %      Basophils Relative 0 %      Neutrophils Absolute 7 63 (H) Thousands/µL      Immature Grans Absolute 0 27 (H) Thousand/uL      Lymphocytes Absolute 4 49 (H) Thousands/µL      Monocytes Absolute 0 91 Thousand/µL      Eosinophils Absolute 0 24 Thousand/µL      Basophils Absolute 0 03 Thousands/µL                  No orders to display              Procedures  ECG 12 Lead Documentation  Date/Time: 9/11/2018 7:06 PM  Performed by: Hermila Banks by: Neeraj Betancur     ECG reviewed by me, the ED Provider: yes    Patient location:  ED  Interpretation:     Interpretation: normal    Rate:     ECG rate assessment: normal    Rhythm:     Rhythm: sinus rhythm    Ectopy:     Ectopy: none    QRS:     QRS axis:  Normal  Conduction:     Conduction: normal    ST segments:     ST segments:  Normal  T waves:     T waves: normal             Phone Contacts  ED Phone Contact    ED Course                               MDM  Number of Diagnoses or Management Options  Anemia:   Diagnosis management comments: Most likely source of blood loss is from the vaginal bleeding from the miscarriage  Patient reports she bled so bad it made her pass out  She reported that she was vaginal bleeding like a faucet  This was on Friday  That bleeding has since slowed down  Currently she only has bleeding as if she has a light period  Rectal exam was done and was heme negative  Patient has hemoglobin of 6 6  She is tachycardic  Blood pressure is on the low side  Patient was consented for blood transfusion  Patient was feeling much improved after initiation of blood transfusion  Amount and/or Complexity of Data Reviewed  Clinical lab tests: ordered and reviewed      The patient presented with a condition in which there was a high probability of imminent or life-threatening deterioration, and critical care services (excluding separately billable procedures) totalled 30-74 minutes (45 minutes excluding procedure time)          Disposition  Final diagnoses:   Anemia     Time reflects when diagnosis was documented in both MDM as applicable and the Disposition within this note     Time User Action Codes Description Comment    9/11/2018  9:06 PM Juli Terry Add [D64 9] Anemia       ED Disposition     ED Disposition Condition Comment    Discharge  Shellie Paz discharge to home/self care  Condition at discharge: Good        Follow-up Information     Follow up With Specialties Details Why Contact Info    Samira Aguillon MD  In 2 days  [address] [address]  N  [phone]            Patient's Medications   Discharge Prescriptions    No medications on file     No discharge procedures on file      ED Provider  Electronically Signed by           Reagan Caro MD  09/11/18 2107       Reagan Caro MD  09/11/18 2116

## 2018-09-12 VITALS
HEIGHT: 65 IN | DIASTOLIC BLOOD PRESSURE: 65 MMHG | BODY MASS INDEX: 35.49 KG/M2 | TEMPERATURE: 98.9 F | OXYGEN SATURATION: 100 % | WEIGHT: 213 LBS | SYSTOLIC BLOOD PRESSURE: 110 MMHG | HEART RATE: 87 BPM | RESPIRATION RATE: 16 BRPM

## 2018-09-12 LAB
ABO GROUP BLD BPU: NORMAL
ABO GROUP BLD BPU: NORMAL
BPU ID: NORMAL
BPU ID: NORMAL
CROSSMATCH: NORMAL
CROSSMATCH: NORMAL
UNIT DISPENSE STATUS: NORMAL
UNIT DISPENSE STATUS: NORMAL
UNIT PRODUCT CODE: NORMAL
UNIT PRODUCT CODE: NORMAL
UNIT RH: NORMAL
UNIT RH: NORMAL

## 2018-09-12 PROCEDURE — 36430 TRANSFUSION BLD/BLD COMPNT: CPT

## 2018-09-12 PROCEDURE — 99284 EMERGENCY DEPT VISIT MOD MDM: CPT

## 2018-09-12 NOTE — DISCHARGE INSTRUCTIONS
Anemia   WHAT YOU NEED TO KNOW:   Anemia is a low number of red blood cells or a low amount of hemoglobin in your red blood cells  Hemoglobin is a protein that helps carry oxygen throughout your body  Red blood cells use iron to create hemoglobin  Anemia may develop if your body does not have enough iron  It may also develop if your body does not make enough red blood cells or they die faster than your body can make them  DISCHARGE INSTRUCTIONS:   Call 911 or have someone call 911 for any of the following:   · You lose consciousness  · You have severe chest pain  Return to the emergency department if:   · You have dark or bloody bowel movements  Contact your healthcare provider if:   · Your symptoms are worse, even after treatment  · You have questions or concerns about your condition or care  Medicines:   · Iron or folic acid supplements  help increase your red blood cell and hemoglobin levels  · Vitamin B12 injections  may help boost your red blood cell level and decrease your symptoms  Ask your healthcare provider how to inject B12  · Take your medicine as directed  Contact your healthcare provider if you think your medicine is not helping or if you have side effects  Tell him of her if you are allergic to any medicine  Keep a list of the medicines, vitamins, and herbs you take  Include the amounts, and when and why you take them  Bring the list or the pill bottles to follow-up visits  Carry your medicine list with you in case of an emergency  Prevent anemia:  Eat healthy foods rich in iron and vitamin C  Nuts, meat, dark leafy green vegetables, and beans are high in iron and protein  Vitamin C helps your body absorb iron  Foods rich in vitamin C include oranges and other citrus fruits  Ask your healthcare provider for a list of other foods that are high in iron or vitamin C  Ask if you need to be on a special diet     Follow up with your healthcare provider as directed:  Write down your questions so you remember to ask them during your visits  ©  260 [employer] Information is for End User's use only and may not be sold, redistributed or otherwise used for commercial purposes  All illustrations and images included in CareNotes® are the copyrighted property of A D A M , Inc  or [employer]  The above information is an  only  It is not intended as medical advice for individual conditions or treatments  Talk to your doctor, nurse or pharmacist before following any medical regimen to see if it is safe and effective for you  Miscarriage   WHAT YOU NEED TO KNOW:   A miscarriage is the loss of a fetus within the first 20 weeks of pregnancy  A miscarriage may also be called a spontaneous  or an early pregnancy loss  DISCHARGE INSTRUCTIONS:   Return to the emergency department if:   · You have foul-smelling drainage or pus coming from your vagina  · You have heavy vaginal bleeding and soak 1 pad or more in an hour  · You have severe abdominal pain  · You feel like your heart is beating faster than normal      · You feel extremely weak or dizzy  Contact your healthcare provider if:   · You have a fever greater than 100 4°F or chills  · You have extreme sadness, grief, or feel unable to cope with what has happened  · You have questions or concerns about your condition or care  Self-care:   · Do not put anything in your vagina for 2 weeks or as directed  Do not use tampons, douche, or have sex  These actions can cause infection and pain  · Use sanitary pads as needed  You may have light bleeding or spotting for 2 weeks  · Do not take a bath or go swimming for 2 weeks or as directed  These actions may increase your risk for an infection  Take showers only  · Rest as needed  Slowly start to do more each day  Return to your daily activities as directed  · Talk to your healthcare provider about birth control    If you would like to prevent another pregnancy, ask your healthcare provider which type of birth control is best for you  · Join a support group or therapy to help you cope  A miscarriage may be very difficult for you, your partner, and other members of your family  There is no right way to feel after a miscarriage  You may feel overwhelming grief or other emotions  It may be helpful to talk to a friend, family member, or counselor about your feelings  You may worry that you could have another miscarriage  Talk to your healthcare provider about your concerns  He may be able to help you reduce the risk for another miscarriage  He may also help you find ways to cope with grief  For more information:   · [employer]  [address] [place] [address]  Phone: 3- [phone] - [phone]  Phone: 2- [phone] - [phone]  Web Address: http://QRGL/  org  · [facility]  [address] [facility] , [address] [place] [address]  Web Address: [employer]  Follow up with your healthcare provider as directed: You may need to see your healthcare provider for blood tests or an ultrasound  Write down your questions so you remember to ask them during your visits  © 2017 [facility] Information is for End User's use only and may not be sold, redistributed or otherwise used for commercial purposes  All illustrations and images included in CareNotes® are the copyrighted property of [employer] , Inc  or [employer]  The above information is an  only  It is not intended as medical advice for individual conditions or treatments  Talk to your doctor, nurse or pharmacist before following any medical regimen to see if it is safe and effective for you

## 2018-09-12 NOTE — ED NOTES
Pt presently receiving first unit of PRBC's, tolerating well  Friend at bedside        Anatoly Owens RN  09/11/18 7241

## 2018-09-12 NOTE — ED NOTES
Blood transfusion completed quickly per request of patient, pt tolerated well, no distress noted  VSS, lungs clear to auscultate  PIV removed and discharge instructions given  Pt ambulated out of ER, gait steady       Anshu Guevara RN  09/12/18 3709

## 2018-09-21 ENCOUNTER — OFFICE VISIT (OUTPATIENT)
Dept: OBGYN CLINIC | Facility: MEDICAL CENTER | Age: 33
End: 2018-09-21
Payer: COMMERCIAL

## 2018-09-21 VITALS — SYSTOLIC BLOOD PRESSURE: 108 MMHG | DIASTOLIC BLOOD PRESSURE: 70 MMHG | BODY MASS INDEX: 35.11 KG/M2 | WEIGHT: 211 LBS

## 2018-09-21 DIAGNOSIS — N85.8 UTERINE MASS: Primary | ICD-10-CM

## 2018-09-21 PROCEDURE — 76857 US EXAM PELVIC LIMITED: CPT | Performed by: OBSTETRICS & GYNECOLOGY

## 2018-09-21 PROCEDURE — 99215 OFFICE O/P EST HI 40 MIN: CPT | Performed by: OBSTETRICS & GYNECOLOGY

## 2018-09-21 RX ORDER — FERROUS SULFATE 325(65) MG
TABLET ORAL
COMMUNITY
Start: 2018-09-10

## 2018-09-21 RX ORDER — MEDROXYPROGESTERONE ACETATE 150 MG/ML
INJECTION, SUSPENSION INTRAMUSCULAR
COMMUNITY
Start: 2018-09-07 | End: 2019-03-29 | Stop reason: SDUPTHER

## 2018-09-21 NOTE — PROGRESS NOTES
Pt returned to office for depo provera injection  Rx was called into [facility] with 1 refill for convenience of patient  Injection was given IM in left deltoid; Pt tolerated injection well  Next injection due:  12/7-12/28/2018 and patient was made aware of these dates  Also patient is due for YV with next injection  Pt had to leave to  children from school so will schedule appointment at her convenience      Lot#:  [id number]  Exp:  09/30/2022    Ash[address] 47:  [id number]

## 2018-09-21 NOTE — PROGRESS NOTES
Assessment/Plan:      Diagnoses and all orders for this visit:    Uterine mass  -     US pelvis complete non OB; Future  -     hCG, quantitative; Future  -     hCG, quantitative; Future    Embryonic demise    Other orders  -     ferrous sulfate 325 (65 Fe) mg tablet; Take by mouth  -     medroxyPROGESTERone (DEPO-PROVERA) 150 mg/mL injection; Inject into a muscle        We reviewed the patient's sequence of events as well as the findings on ultrasound  The patient does relate that she has been previously told that with her  delivery that it was noted on subsequent imaging that the patient had incomplete closure of her uterine wound  This was not apparent on previous imaging by myself  We did discuss the possibility that this may be blood that has collected within the myometrial disruption  Although this is an unclear finding to me  I have suggested pelvic ultrasound in Radiology  Patient also scheduled for weekly quantitative beta HCGs to assure that this is not tissue relative to gestational trophoblastic disease  Patient hopefully will receive her Depo-Provera injection today as she has not received to date  Precautions were reviewed with the patient to call me if she has any excessive bleeding or abdominal discomfort  This visit was for 45 minutes with greater than 50% of that afforded to counseling and coordination of care  Subjective:     Patient ID: Bismark Mills is a 34 y o  female  Patient returns for follow-up of embryonic demise and medical management  Patient had almost immediate response to the misoprostol with bleeding occurring soon after leaving the office  Unfortunately patient had persistence of heavy bleeding over the course of about 24 hours  She eventually presented to her primary care physician who did an initial hemoglobin that patient relates was 6  She was then sent to the emergency room for evaluation and received a subsequent blood transfusion    Patient relates that her bleeding is much improved however she does note passage of some clots when she is seated for urination or on the toilet  She is much improved as relates to shortness of breath or dizziness  She has mild fatigue  She is on iron supplementation  Review of Systems   All other systems reviewed and are negative  Objective:     Physical Exam   Constitutional: She is oriented to person, place, and time  She appears well-developed and well-nourished  Pulmonary/Chest: Effort normal    Abdominal: Soft  There is no tenderness  Genitourinary:   Genitourinary Comments: External genitalia normal female without lesions  Vagina healthy without lesions  Cervix closed with scant dark brown discharge no lesions noted  Uterus normal size nontender  Adnexa nontender without mass   Neurological: She is alert and oriented to person, place, and time  Skin: Skin is warm and dry  Psychiatric: She has a normal mood and affect  Her behavior is normal  Judgment and thought content normal          Transvaginal pelvic ultrasound is performed:    Endometrial cavity demonstrates passage of the previously noted gestational sac and embryonic demise  There is a scant amount of fluid in the endometrial cavity measuring 3 mm  There is a heterogeneous mass occupying the anterior lower uterine segment measuring 2 67 cm in greatest dimension  There is no blood flow noted on color flow analysis  There is no free peritoneal fluid  Neither ovary is easily seen on today's evaluation

## 2018-10-05 ENCOUNTER — TRANSCRIBE ORDERS (OUTPATIENT)
Dept: RADIOLOGY | Facility: HOSPITAL | Age: 33
End: 2018-10-05

## 2018-10-05 ENCOUNTER — HOSPITAL ENCOUNTER (OUTPATIENT)
Dept: RADIOLOGY | Facility: HOSPITAL | Age: 33
Discharge: HOME | End: 2018-10-05
Attending: OBSTETRICS & GYNECOLOGY
Payer: COMMERCIAL

## 2018-10-05 ENCOUNTER — TELEPHONE (OUTPATIENT)
Dept: OBGYN CLINIC | Facility: CLINIC | Age: 33
End: 2018-10-05

## 2018-10-05 DIAGNOSIS — N85.8 UTERINE MASS: ICD-10-CM

## 2018-10-05 PROCEDURE — 76856 US EXAM PELVIC COMPLETE: CPT

## 2018-10-05 PROCEDURE — 76830 TRANSVAGINAL US NON-OB: CPT

## 2018-10-05 NOTE — TELEPHONE ENCOUNTER
Pelvic us not in epic,  pls add this to system,   Pt is there now,   [facility],  thanks

## 2018-10-11 ENCOUNTER — TELEPHONE (OUTPATIENT)
Dept: OBGYN CLINIC | Facility: CLINIC | Age: 33
End: 2018-10-11

## 2018-10-11 NOTE — PROGRESS NOTES
Inform pt pelvic u/s confirms abnormality that I saw in office  It is extremely important that she have quant hcg performed as ordered

## 2018-10-11 NOTE — TELEPHONE ENCOUNTER
----- Message from Krista Cherry DO sent at 10/11/2018  2:12 PM EDT -----  Inform pt pelvic u/s confirms abnormality that I saw in office  It is extremely important that she have quant hcg performed as ordered

## 2018-10-11 NOTE — TELEPHONE ENCOUNTER
Patient returned call - she is aware that pelvic u/s confirms abnormality  She hasn't done the b/w yet due to death in family and [place]  She will get it done tomorrow

## 2018-10-12 ENCOUNTER — APPOINTMENT (OUTPATIENT)
Dept: LAB | Facility: HOSPITAL | Age: 33
End: 2018-10-12
Attending: OBSTETRICS & GYNECOLOGY
Payer: COMMERCIAL

## 2018-10-12 DIAGNOSIS — N85.8 UTERINE MASS: ICD-10-CM

## 2018-10-12 LAB — B-HCG SERPL-ACNC: 7 MIU/ML

## 2018-10-12 PROCEDURE — 84702 CHORIONIC GONADOTROPIN TEST: CPT

## 2018-10-12 PROCEDURE — 36415 COLL VENOUS BLD VENIPUNCTURE: CPT

## 2018-10-24 ENCOUNTER — TELEPHONE (OUTPATIENT)
Dept: OBGYN CLINIC | Facility: CLINIC | Age: 33
End: 2018-10-24

## 2018-10-24 NOTE — TELEPHONE ENCOUNTER
Patient called - she had an HCG done 10/12  Results available, please review and advise  Inquired about her bleeding and if she was still passing tissue - states she is no longer bleeding and passing the clots  Feels a lot better  Wants to know if she needs the D&C  Please advise  Thanks!

## 2018-10-26 ENCOUNTER — TELEPHONE (OUTPATIENT)
Dept: OBGYN CLINIC | Facility: CLINIC | Age: 33
End: 2018-10-26

## 2018-10-26 DIAGNOSIS — N93.9 ABNORMAL UTERINE BLEEDING: Primary | ICD-10-CM

## 2018-10-26 NOTE — TELEPHONE ENCOUNTER
----- Message from Salvatore Higgins DO sent at 10/25/2018  5:33 PM EDT -----  Spoke with pt  She is no longer bleeding; feeling much better   hcg 7  Discussed U/S findings  Will plan repeat pelvic u/s and quant   hcg first week of November  Please order both studies and inform pt  If u/s shows no change, will plan MRI

## 2018-10-26 NOTE — TELEPHONE ENCOUNTER
Called pt back - she is aware that she needs to have another u/s and HCG done in the first week of November  Orders in pt chart  Transferred pt to central scheduling to make an appointment

## 2018-10-31 ENCOUNTER — TELEPHONE (OUTPATIENT)
Dept: OBGYN CLINIC | Facility: CLINIC | Age: 33
End: 2018-10-31

## 2018-10-31 NOTE — TELEPHONE ENCOUNTER
Spoke with pt - her car broke down  Apologizes for missing her appointment  Transferred pt to central scheduling to reschedule

## 2018-10-31 NOTE — TELEPHONE ENCOUNTER
FINN CALLED FROM [facility] TO LET OUR OFFICE KNOW THAT MONICA HOOPER ( 1985) DID NOT SHOW FOR HER APPOINTMENT TODAY   SHE WAS SUPPOSE TO HAVE ULTRASOUND PELVIC COMPLETE WITH TRANSVAGINAL

## 2019-01-09 ENCOUNTER — APPOINTMENT (OUTPATIENT)
Dept: LAB | Facility: CLINIC | Age: 34
End: 2019-01-09
Payer: COMMERCIAL

## 2019-01-09 DIAGNOSIS — Z30.42 ENCOUNTER FOR DEPO-PROVERA CONTRACEPTION: Primary | ICD-10-CM

## 2019-01-09 LAB — B-HCG SERPL-ACNC: <2 MIU/ML

## 2019-01-09 PROCEDURE — 36415 COLL VENOUS BLD VENIPUNCTURE: CPT | Performed by: OBSTETRICS & GYNECOLOGY

## 2019-01-09 PROCEDURE — 84702 CHORIONIC GONADOTROPIN TEST: CPT | Performed by: OBSTETRICS & GYNECOLOGY

## 2019-01-11 ENCOUNTER — OFFICE VISIT (OUTPATIENT)
Dept: OBGYN CLINIC | Age: 34
End: 2019-01-11
Payer: COMMERCIAL

## 2019-01-11 VITALS
DIASTOLIC BLOOD PRESSURE: 84 MMHG | BODY MASS INDEX: 35.32 KG/M2 | WEIGHT: 212.25 LBS | RESPIRATION RATE: 18 BRPM | SYSTOLIC BLOOD PRESSURE: 122 MMHG

## 2019-01-11 DIAGNOSIS — Z30.42 SURVEILLANCE FOR DEPO-PROVERA CONTRACEPTION: Primary | ICD-10-CM

## 2019-01-11 PROBLEM — O02.1 EMBRYONIC DEMISE: Status: RESOLVED | Noted: 2018-08-22 | Resolved: 2019-01-11

## 2019-01-11 PROBLEM — E66.09 NON MORBID OBESITY DUE TO EXCESS CALORIES: Status: RESOLVED | Noted: 2017-08-16 | Resolved: 2019-01-11

## 2019-01-11 PROBLEM — O99.330 TOBACCO SMOKING COMPLICATING PREGNANCY: Status: RESOLVED | Noted: 2017-09-22 | Resolved: 2019-01-11

## 2019-01-11 PROCEDURE — 96372 THER/PROPH/DIAG INJ SC/IM: CPT | Performed by: OBSTETRICS & GYNECOLOGY

## 2019-01-11 RX ORDER — MEDROXYPROGESTERONE ACETATE 150 MG/ML
150 INJECTION, SUSPENSION INTRAMUSCULAR ONCE
Status: COMPLETED | OUTPATIENT
Start: 2019-01-11 | End: 2019-01-11

## 2019-01-11 RX ADMIN — MEDROXYPROGESTERONE ACETATE 150 MG: 150 INJECTION, SUSPENSION INTRAMUSCULAR at 10:11

## 2019-01-11 NOTE — PROGRESS NOTES
Patient here for depo-provera injection  Patient missed window  But beta hcg done  Negative results  Patient tolerated injection well given in right deltoid   Next depo-provera injection due 3/29/19-4/12/19    ndc #: [address]-8  Lot #: [id number]  Exp date: 11/30/2022

## 2019-03-29 ENCOUNTER — OFFICE VISIT (OUTPATIENT)
Dept: OBGYN CLINIC | Age: 34
End: 2019-03-29
Payer: COMMERCIAL

## 2019-03-29 VITALS
SYSTOLIC BLOOD PRESSURE: 122 MMHG | DIASTOLIC BLOOD PRESSURE: 70 MMHG | WEIGHT: 213 LBS | HEIGHT: 65 IN | BODY MASS INDEX: 35.49 KG/M2

## 2019-03-29 DIAGNOSIS — Z30.42 ENCOUNTER FOR DEPO-PROVERA CONTRACEPTION: Primary | ICD-10-CM

## 2019-03-29 PROCEDURE — 96372 THER/PROPH/DIAG INJ SC/IM: CPT | Performed by: NURSE PRACTITIONER

## 2019-03-29 RX ORDER — MEDROXYPROGESTERONE ACETATE 150 MG/ML
150 INJECTION, SUSPENSION INTRAMUSCULAR ONCE
Status: COMPLETED | OUTPATIENT
Start: 2019-03-29 | End: 2019-03-29

## 2019-03-29 RX ORDER — MEDROXYPROGESTERONE ACETATE 150 MG/ML
150 INJECTION, SUSPENSION INTRAMUSCULAR ONCE
Status: DISCONTINUED | OUTPATIENT
Start: 2019-03-29 | End: 2019-11-25

## 2019-03-29 RX ADMIN — MEDROXYPROGESTERONE ACETATE 150 MG: 150 INJECTION, SUSPENSION INTRAMUSCULAR at 12:52

## 2019-03-29 NOTE — PATIENT INSTRUCTIONS
Medroxyprogesterone (By injection)   Medroxyprogesterone (mh-djja-yq-proe-DHEERAJ-ter-one)  Prevents pregnancy  Also treats endometriosis and is used with other medicines to help relieve symptoms of cancer, including uterine or kidney cancer  Brand Name(s): Depo-Provera, Depo-Provera Contraceptive, Depo-SubQ Provera 104   There may be other brand names for this medicine  When This Medicine Should Not Be Used: This medicine is not right for everyone  You should not receive it if you had an allergic reaction to medroxyprogesterone or if you have a history of breast cancer or blood clots (including heart attack or stroke)  In most cases, you should not use this medicine while you are pregnant  How to Use This Medicine:   Injectable  · A nurse or other health provider will give you this medicine  This medicine is given as a shot into a muscle or just under the skin  · Your exact treatment schedule depends on the reason you are using this medicine  You doctor will explain your personal schedule  ¨ For treatment of cancer symptoms, you may start with a shot once per week  You may need fewer shots as your treatment goes forward  ¨ For birth control or endometriosis, you will need a shot every 3 months (13 weeks)  Read and follow the patient instructions that come with this medicine  Talk to your doctor or pharmacist if you have any questions  ¨ You might need to have the first shot during the first 5 days of your normal menstrual period, to make sure you are not pregnant  If you have just had a baby, you may receive a shot 5 days after birth if you are not breastfeeding or 6 weeks after birth if you are breastfeeding  · Missed dose: You must receive a shot every 3 months if you want to prevent pregnancy  Talk to your doctor or pharmacist if you do not receive your medicine on time, because you may need another form of birth control    Drugs and Foods to Avoid:   Ask your doctor or pharmacist before using any other medicine, including over-the-counter medicines, vitamins, and herbal products  · Some foods and medicines can affect how medroxyprogesterone works  Tell your doctor if you are using any of the following:  ¨ Aminoglutethimide, bosentan, carbamazepine, felbamate, griseofulvin, nefazodone, oxcarbazepine, phenobarbital, phenytoin, rifabutin, rifampin, rifapentine, Erin's wort, topiramate  ¨ Medicine to treat an infection (including clarithromycin, itraconazole, ketoconazole, telithromycin, voriconazole)  ¨ Medicine to treat HIV/AIDS (including atazanavir, indinavir, nelfinavir, ritonavir, saquinavir)  Warnings While Using This Medicine:   · Tell your doctor right away if you think you have become pregnant  · Tell your doctor if you are breastfeeding or if you have liver disease, kidney disease, asthma, diabetes, heart disease, seizures, migraine headaches, an eating disorder, osteoporosis, or a history of depression  Tell your doctor if you smoke  · This medicine may cause the following problems:  ¨ Blood clots, which could lead to stroke, heart attack, or other serious problems  ¨ Possible increased risk of breast cancer  ¨ Weak or thin bones, especially with long-term use  · You should not use this medicine for long-term birth control unless you cannot use any other form of birth control  · This medicine will not protect you from HIV/AIDS or other sexually transmitted diseases  · Tell any doctor or dentist who treats you that you are using this medicine  This medicine may affect certain medical test results  · Your doctor will check your progress and the effects of this medicine at regular visits  Keep all appointments    Possible Side Effects While Using This Medicine:   Call your doctor right away if you notice any of these side effects:  · Allergic reaction: Itching or hives, swelling in your face or hands, swelling or tingling in your mouth or throat, chest tightness, trouble breathing  · Chest pain, trouble breathing, or coughing up blood  · Dark urine or pale stools, nausea, vomiting, loss of appetite, stomach pain, yellow skin or eyes  · Heavy or nonstop vaginal bleeding  · Loss of vision, double vision  · Numbness or weakness on one side of your body, sudden or severe headache, problems with vision, speech, or walking  · Severe stomach pain or cramps  If you notice these less serious side effects, talk with your doctor:   · Headache  · Light or missed monthly periods, spotting between periods  · Nervousness or dizziness  · Pain, redness, burning, swelling, or a lump under your skin where the shot was given  · Weight gain  If you notice other side effects that you think are caused by this medicine, tell your doctor  Call your doctor for medical advice about side effects  You may report side effects to FDA at 7-379-FDA-3251  © 2017 [address] [address] Information is for End User's use only and may not be sold, redistributed or otherwise used for commercial purposes  The above information is an  only  It is not intended as medical advice for individual conditions or treatments  Talk to your doctor, nurse or pharmacist before following any medical regimen to see if it is safe and effective for you

## 2019-03-29 NOTE — PROGRESS NOTES
Pt is here for Depo Provera injection  Her last dose was 01/11/19  Her annual exam is SCHEDULED for 06/18/19  Depo given in L Deltoid  Tolerated well  Lot [id number] Exp 04/30/23  Next dose due 06/14-06/28

## 2019-06-18 ENCOUNTER — ANNUAL EXAM (OUTPATIENT)
Dept: OBGYN CLINIC | Age: 34
End: 2019-06-18
Payer: COMMERCIAL

## 2019-06-18 VITALS
WEIGHT: 219 LBS | BODY MASS INDEX: 36.49 KG/M2 | HEIGHT: 65 IN | DIASTOLIC BLOOD PRESSURE: 72 MMHG | SYSTOLIC BLOOD PRESSURE: 120 MMHG

## 2019-06-18 DIAGNOSIS — Z01.419 ENCOUNTER FOR GYNECOLOGICAL EXAMINATION WITHOUT ABNORMAL FINDING: Primary | ICD-10-CM

## 2019-06-18 DIAGNOSIS — Z30.42 ENCOUNTER FOR DEPO-PROVERA CONTRACEPTION: ICD-10-CM

## 2019-06-18 PROCEDURE — 96372 THER/PROPH/DIAG INJ SC/IM: CPT | Performed by: NURSE PRACTITIONER

## 2019-06-18 PROCEDURE — 99395 PREV VISIT EST AGE 18-39: CPT | Performed by: NURSE PRACTITIONER

## 2019-06-18 RX ORDER — MEDROXYPROGESTERONE ACETATE 150 MG/ML
150 INJECTION, SUSPENSION INTRAMUSCULAR
Qty: 1 ML | Refills: 3 | Status: SHIPPED | OUTPATIENT
Start: 2019-06-18 | End: 2019-11-25 | Stop reason: SDUPTHER

## 2019-06-18 RX ORDER — MEDROXYPROGESTERONE ACETATE 150 MG/ML
150 INJECTION, SUSPENSION INTRAMUSCULAR ONCE
Status: COMPLETED | OUTPATIENT
Start: 2019-06-18 | End: 2019-06-18

## 2019-06-18 RX ADMIN — MEDROXYPROGESTERONE ACETATE 150 MG: 150 INJECTION, SUSPENSION INTRAMUSCULAR at 10:43

## 2019-09-04 ENCOUNTER — CLINICAL SUPPORT (OUTPATIENT)
Dept: OBGYN CLINIC | Age: 34
End: 2019-09-04
Payer: COMMERCIAL

## 2019-09-04 DIAGNOSIS — N93.9 ABNORMAL UTERINE BLEEDING: Primary | ICD-10-CM

## 2019-09-04 DIAGNOSIS — Z30.42 SURVEILLANCE FOR DEPO-PROVERA CONTRACEPTION: Primary | ICD-10-CM

## 2019-09-04 PROCEDURE — 96372 THER/PROPH/DIAG INJ SC/IM: CPT | Performed by: NURSE PRACTITIONER

## 2019-09-04 RX ORDER — MEDROXYPROGESTERONE ACETATE 150 MG/ML
150 INJECTION, SUSPENSION INTRAMUSCULAR ONCE
Status: COMPLETED | OUTPATIENT
Start: 2019-09-04 | End: 2019-09-04

## 2019-09-04 RX ORDER — MEDROXYPROGESTERONE ACETATE 150 MG/ML
INJECTION, SUSPENSION INTRAMUSCULAR
Qty: 1 SYRINGE | Refills: 1 | Status: SHIPPED | OUTPATIENT
Start: 2019-09-04

## 2019-09-04 RX ADMIN — MEDROXYPROGESTERONE ACETATE 150 MG: 150 INJECTION, SUSPENSION INTRAMUSCULAR at 09:57

## 2019-09-04 NOTE — PROGRESS NOTES
Patient is here for depo injection  Administered on right deltoid, patient tolerated well  Patient does have concerns of migraines and this was discussed with Christiana Solorio, whom stated more than likely it is unrelated to the Depo injection since she has been on it for over 1 yr  The recommendation is for her to f/u with her PCP and perhaps be evaluated by Neurology  Patient understood and will f/u with her PCP  NDC#: [address]-3  Lot: [id number]  Exp: 12/31/23    Administered by PATTIE Rivera

## 2019-11-25 ENCOUNTER — OFFICE VISIT (OUTPATIENT)
Dept: OBGYN CLINIC | Age: 34
End: 2019-11-25
Payer: COMMERCIAL

## 2019-11-25 VITALS
BODY MASS INDEX: 35.32 KG/M2 | WEIGHT: 212 LBS | DIASTOLIC BLOOD PRESSURE: 62 MMHG | HEIGHT: 65 IN | SYSTOLIC BLOOD PRESSURE: 120 MMHG

## 2019-11-25 DIAGNOSIS — Z30.42 ENCOUNTER FOR DEPO-PROVERA CONTRACEPTION: Primary | ICD-10-CM

## 2019-11-25 PROCEDURE — 96372 THER/PROPH/DIAG INJ SC/IM: CPT | Performed by: NURSE PRACTITIONER

## 2019-11-25 RX ORDER — MEDROXYPROGESTERONE ACETATE 150 MG/ML
150 INJECTION, SUSPENSION INTRAMUSCULAR ONCE
Status: COMPLETED | OUTPATIENT
Start: 2019-11-25 | End: 2019-11-25

## 2019-11-25 RX ORDER — PHENTERMINE HYDROCHLORIDE 37.5 MG/1
TABLET ORAL
COMMUNITY
Start: 2019-11-18

## 2019-11-25 RX ADMIN — MEDROXYPROGESTERONE ACETATE 150 MG: 150 INJECTION, SUSPENSION INTRAMUSCULAR at 10:37

## 2019-11-25 NOTE — PROGRESS NOTES
Pt is here for Depo Provera injection  Her last dose was 9/4/19  Her annual exam was on 06/18/19  Depo given in L Deltoid  Tolerated well  Lot [id number] Exp 01/31/24  Next dose due 02/10-02/24

## 2020-02-10 ENCOUNTER — CLINICAL SUPPORT (OUTPATIENT)
Dept: OBGYN CLINIC | Facility: CLINIC | Age: 35
End: 2020-02-10
Payer: COMMERCIAL

## 2020-02-10 VITALS — DIASTOLIC BLOOD PRESSURE: 62 MMHG | SYSTOLIC BLOOD PRESSURE: 100 MMHG | WEIGHT: 217.2 LBS | BODY MASS INDEX: 36.14 KG/M2

## 2020-02-10 DIAGNOSIS — Z30.42 ENCOUNTER FOR DEPO-PROVERA CONTRACEPTION: Primary | ICD-10-CM

## 2020-02-10 PROCEDURE — 96372 THER/PROPH/DIAG INJ SC/IM: CPT | Performed by: STUDENT IN AN ORGANIZED HEALTH CARE EDUCATION/TRAINING PROGRAM

## 2020-02-10 RX ORDER — MEDROXYPROGESTERONE ACETATE 150 MG/ML
150 INJECTION, SUSPENSION INTRAMUSCULAR ONCE
Status: COMPLETED | OUTPATIENT
Start: 2020-02-10 | End: 2020-02-10

## 2020-02-10 RX ADMIN — MEDROXYPROGESTERONE ACETATE 150 MG: 150 INJECTION, SUSPENSION INTRAMUSCULAR at 11:25

## 2020-02-10 NOTE — PROGRESS NOTES
Pt is here for Depo Provera injection  Her last dose was 11/25/19  Her annual exam was on 6/19/19  Depo given in right buttock,  Tolerated well  Lot [id number] Exp 01/31/2024  Next dose due 5/4/20-5/11/20

## 2020-09-24 ENCOUNTER — ANNUAL EXAM (OUTPATIENT)
Dept: OBGYN CLINIC | Facility: CLINIC | Age: 35
End: 2020-09-24
Payer: COMMERCIAL

## 2020-09-24 VITALS
BODY MASS INDEX: 34.88 KG/M2 | TEMPERATURE: 98 F | DIASTOLIC BLOOD PRESSURE: 80 MMHG | SYSTOLIC BLOOD PRESSURE: 122 MMHG | WEIGHT: 209.6 LBS

## 2020-09-24 DIAGNOSIS — Z01.419 ENCOUNTER FOR ANNUAL ROUTINE GYNECOLOGICAL EXAMINATION: Primary | ICD-10-CM

## 2020-09-24 DIAGNOSIS — Z11.3 SCREENING FOR STD (SEXUALLY TRANSMITTED DISEASE): ICD-10-CM

## 2020-09-24 DIAGNOSIS — Z30.09 ENCOUNTER FOR OTHER GENERAL COUNSELING OR ADVICE ON CONTRACEPTION: ICD-10-CM

## 2020-09-24 PROBLEM — Z30.9 CONTRACEPTIVE MANAGEMENT: Status: ACTIVE | Noted: 2020-09-24

## 2020-09-24 PROBLEM — Z30.42 ENCOUNTER FOR SURVEILLANCE OF INJECTABLE CONTRACEPTIVE: Status: ACTIVE | Noted: 2020-09-24

## 2020-09-24 PROCEDURE — 87591 N.GONORRHOEAE DNA AMP PROB: CPT | Performed by: NURSE PRACTITIONER

## 2020-09-24 PROCEDURE — 87624 HPV HI-RISK TYP POOLED RSLT: CPT | Performed by: NURSE PRACTITIONER

## 2020-09-24 PROCEDURE — 99395 PREV VISIT EST AGE 18-39: CPT | Performed by: NURSE PRACTITIONER

## 2020-09-24 PROCEDURE — G0145 SCR C/V CYTO,THINLAYER,RESCR: HCPCS | Performed by: NURSE PRACTITIONER

## 2020-09-24 PROCEDURE — 87491 CHLMYD TRACH DNA AMP PROBE: CPT | Performed by: NURSE PRACTITIONER

## 2020-09-24 NOTE — PROGRESS NOTES
Diagnoses and all orders for this visit:    1  Encounter for annual routine gynecological examination  -     Liquid-based pap, screening  Pap collected today, discussed new guidelines  Healthy eating and nutrition, daily exercise discussed and SBE reinforced  Call with any issues and all questions and concerns addressed  2  Encounter for other general counseling or advice on contraception  Reviewed Mirena IUD option  Will order when she is ready  3  Screening for STD (sexually transmitted disease)  -     HIV 1/2 Antigen/Antibody (4th Generation) w Reflex [facility]; Future  -     Hepatitis B surface antigen; Future  -     Hepatitis C antibody; Future  -     RPR; Future  -     Chlamydia/GC amplified DNA by PCR          All questions and concerns answered  Patient to call with any questions  Pleasant 37 y o  premenopausal female here for annual exam  Leslie Jamison, C/S deliveries and twins  She denies any issues with bleeding or her menses  Reports regular cycles  She has a history of abnormal pap smears, with LEEP procedure in   She had vaginal deliveries after LEEP with no problem  Last Pap in 2017, neg PAP & HPV, no pap due today  Patient requests pap today  Denies vaginal, urinary or breast issues, today  Denies pelvic pain  Sexually active without any concerns and pt requests STD testing including blood work  Denies F/C/N/V      Past Medical History:   Diagnosis Date    Abnormal Pap smear of cervix     Asthma     Last Assessed: 2017    Obese     Varicella     childhood     Past Surgical History:   Procedure Laterality Date    CERVICAL BIOPSY  W/ LOOP ELECTRODE EXCISION      CERVICAL BIOPSY  W/ LOOP ELECTRODE EXCISION      Last Assessed: 2017     SECTION      2010 son, 2013 twin sons; low transverse    DILATION AND EVACUATION      Surgically induced  - ,      Family History   Problem Relation Age of Onset    Heart disease Father         Cardiac Disorder  Asthma Brother     Cancer Maternal Grandmother     Breast cancer Maternal Grandmother     Diabetes Maternal Grandfather     Colon cancer Maternal Grandfather     Ovarian cancer Neg Hx     Uterine cancer Neg Hx     Cervical cancer Neg Hx      Social History     Tobacco Use    Smoking status: Former Smoker    Smokeless tobacco: Current User   Substance Use Topics    Alcohol use: No    Drug use: No       Current Outpatient Medications:     albuterol (PROVENTIL HFA,VENTOLIN HFA) 90 mcg/act inhaler, Inhale 2 puffs, Disp: , Rfl:     ferrous sulfate 325 (65 Fe) mg tablet, Take by mouth, Disp: , Rfl:     hydrocortisone 1 % cream, Apply 1 application topically as needed for irritation, Disp: 30 g, Rfl: 0    Nystatin POWD, Apply to abdominal crease, Disp: , Rfl:     phentermine (ADIPEX-P) 37 5 MG tablet, TAKE 1 TABLET BY MOUTH DAILY BEFORE BREAKFAST, Disp: , Rfl:     medroxyPROGESTERone acetate (DEPO-PROVERA SYRINGE) 150 mg/mL injection, TO BE INJECTED IN DOCTOR'S OFFICE (Patient not taking: Reported on 2020), Disp: 1 Syringe, Rfl: 1  Patient Active Problem List    Diagnosis Date Noted    Encounter for annual routine gynecological examination 2020    Encounter for surveillance of injectable contraceptive 2020    Screening for STD (sexually transmitted disease) 2020    Contraceptive management 2020    Encounter for gynecological examination without abnormal finding 2019    Encounter for Depo-Provera contraception 2019    Uterine mass 2018    Asthma 2017    Maternal obesity syndrome 2017    Depression 2014    Iron deficiency anemia 2008       No Known Allergies    OB History    Para Term  AB Living   6 4 2 2 2 5   SAB TAB Ectopic Multiple Live Births     2   1 5      # Outcome Date GA Lbr Pk/2nd Weight Sex Delivery Anes PTL Lv   6 Term 17 40w1d / 00:21 3820 g (8 lb 6 8 oz) M Vag-Spont Local N MORE   5A  10/15/13 36w0d  2296 g (5 lb 1 oz) M CS-LTranv Gen  MORE   5B  10/15/13   2240 g (4 lb 15 oz) M CS-LTranv Gen  MORE   4 Term 10/18/10 41w0d  3374 g (7 lb 7 oz) M Vag-Spont None  MORE   3  08 27w0d  1162 g (2 lb 9 oz) M Vag-Spont None  MORE      Complications: PROM (premature rupture of membranes), Cervical incompetence   2 TAB            1 TAB 2006               Vitals:    20 0947   BP: 122/80   BP Location: Right arm   Patient Position: Sitting   Cuff Size: Standard   Temp: 98 °F (36 7 °C)   Weight: 95 1 kg (209 lb 9 6 oz)     Body mass index is 34 88 kg/m²  Review of Systems   Constitutional: Negative for chills, fatigue, fever and unexpected weight change  Respiratory: Negative for shortness of breath  Gastrointestinal: Negative for anal bleeding, blood in stool, constipation and diarrhea  Genitourinary: Negative for difficulty urinating, dysuria and hematuria  OBGyn Exam    Physical Exam   Constitutional: She appears well-developed and well-nourished  No distress  Head: Normocephalic  Neck: Normal range of motion  Neck supple  Pulmonary: Effort normal   Breasts: bilateral without masses, skin changes or nipple discharge  Bilaterally soft and warm to touch  No areas of erythema or pain  Abdominal: Soft  Non-tender  Pelvic exam was performed with patient supine  No labial fusion  There is no rash, tenderness, lesion or injury on the right labia  There is no rash, tenderness, lesion or injury on the left labia  Uterus is not deviated, not enlarged, not fixed and not tender  Cervix exhibits no motion tenderness, no discharge and ++friability  Right adnexum displays no mass, no tenderness and no fullness  Left adnexum displays no mass, no tenderness and no fullness  No erythema or tenderness in the vagina  No foreign body in the vagina  No signs of injury around the vagina  No vaginal discharge found   PAP collected w/o difficulty  Lymphadenopathy:        Right: No inguinal adenopathy present          Left: No inguinal adenopathy present

## 2020-09-24 NOTE — PATIENT INSTRUCTIONS
Medroxyprogesterone (By injection)   Medroxyprogesterone (hq-wwzv-xz-proe-DHEERAJ-ter-one)  Prevents pregnancy  Also treats endometriosis and is used with other medicines to help relieve symptoms of cancer, including uterine or kidney cancer  Brand Name(s): Depo-Provera, Depo-Provera Contraceptive, Depo-SubQ Provera 104   There may be other brand names for this medicine  When This Medicine Should Not Be Used: This medicine is not right for everyone  You should not receive it if you had an allergic reaction to medroxyprogesterone or if you have a history of breast cancer or blood clots (including heart attack or stroke)  In most cases, you should not use this medicine while you are pregnant  How to Use This Medicine:   Injectable  · A nurse or other health provider will give you this medicine  This medicine is given as a shot into a muscle or just under the skin  · Your exact treatment schedule depends on the reason you are using this medicine  You doctor will explain your personal schedule  ¨ For treatment of cancer symptoms, you may start with a shot once per week  You may need fewer shots as your treatment goes forward  ¨ For birth control or endometriosis, you will need a shot every 3 months (13 weeks)  Read and follow the patient instructions that come with this medicine  Talk to your doctor or pharmacist if you have any questions  ¨ You might need to have the first shot during the first 5 days of your normal menstrual period, to make sure you are not pregnant  If you have just had a baby, you may receive a shot 5 days after birth if you are not breastfeeding or 6 weeks after birth if you are breastfeeding  · Missed dose: You must receive a shot every 3 months if you want to prevent pregnancy  Talk to your doctor or pharmacist if you do not receive your medicine on time, because you may need another form of birth control    Drugs and Foods to Avoid:   Ask your doctor or pharmacist before using any other medicine, including over-the-counter medicines, vitamins, and herbal products  · Some foods and medicines can affect how medroxyprogesterone works  Tell your doctor if you are using any of the following:  ¨ Aminoglutethimide, bosentan, carbamazepine, felbamate, griseofulvin, nefazodone, oxcarbazepine, phenobarbital, phenytoin, rifabutin, rifampin, rifapentine, Erin's wort, topiramate  ¨ Medicine to treat an infection (including clarithromycin, itraconazole, ketoconazole, telithromycin, voriconazole)  ¨ Medicine to treat HIV/AIDS (including atazanavir, indinavir, nelfinavir, ritonavir, saquinavir)  Warnings While Using This Medicine:   · Tell your doctor right away if you think you have become pregnant  · Tell your doctor if you are breastfeeding or if you have liver disease, kidney disease, asthma, diabetes, heart disease, seizures, migraine headaches, an eating disorder, osteoporosis, or a history of depression  Tell your doctor if you smoke  · This medicine may cause the following problems:  ¨ Blood clots, which could lead to stroke, heart attack, or other serious problems  ¨ Possible increased risk of breast cancer  ¨ Weak or thin bones, especially with long-term use  · You should not use this medicine for long-term birth control unless you cannot use any other form of birth control  · This medicine will not protect you from HIV/AIDS or other sexually transmitted diseases  · Tell any doctor or dentist who treats you that you are using this medicine  This medicine may affect certain medical test results  · Your doctor will check your progress and the effects of this medicine at regular visits  Keep all appointments    Possible Side Effects While Using This Medicine:   Call your doctor right away if you notice any of these side effects:  · Allergic reaction: Itching or hives, swelling in your face or hands, swelling or tingling in your mouth or throat, chest tightness, trouble breathing  · Chest pain, trouble breathing, or coughing up blood  · Dark urine or pale stools, nausea, vomiting, loss of appetite, stomach pain, yellow skin or eyes  · Heavy or nonstop vaginal bleeding  · Loss of vision, double vision  · Numbness or weakness on one side of your body, sudden or severe headache, problems with vision, speech, or walking  · Severe stomach pain or cramps  If you notice these less serious side effects, talk with your doctor:   · Headache  · Light or missed monthly periods, spotting between periods  · Nervousness or dizziness  · Pain, redness, burning, swelling, or a lump under your skin where the shot was given  · Weight gain  If you notice other side effects that you think are caused by this medicine, tell your doctor  Call your doctor for medical advice about side effects  You may report side effects to FDA at 5-877-FDA-6242  © 2017 [address] [employer] Information is for End User's use only and may not be sold, redistributed or otherwise used for commercial purposes  The above information is an  only  It is not intended as medical advice for individual conditions or treatments  Talk to your doctor, nurse or pharmacist before following any medical regimen to see if it is safe and effective for you

## 2020-10-01 LAB
C TRACH DNA SPEC QL NAA+PROBE: NEGATIVE
N GONORRHOEA DNA SPEC QL NAA+PROBE: NEGATIVE

## 2020-10-05 DIAGNOSIS — N76.0 ACUTE VAGINITIS: Primary | ICD-10-CM

## 2020-10-05 LAB
HPV HR 12 DNA CVX QL NAA+PROBE: NEGATIVE
HPV16 DNA CVX QL NAA+PROBE: NEGATIVE
HPV18 DNA CVX QL NAA+PROBE: NEGATIVE
LAB AP GYN PRIMARY INTERPRETATION: NORMAL
Lab: NORMAL
PATH INTERP SPEC-IMP: NORMAL

## 2020-10-05 RX ORDER — METRONIDAZOLE 7.5 MG/G
1 GEL VAGINAL
Qty: 70 G | Refills: 0 | Status: SHIPPED | OUTPATIENT
Start: 2020-10-05 | End: 2020-10-10

## 2020-10-14 ENCOUNTER — TELEPHONE (OUTPATIENT)
Dept: OBGYN CLINIC | Age: 35
End: 2020-10-14